# Patient Record
Sex: FEMALE | Race: WHITE | NOT HISPANIC OR LATINO
[De-identification: names, ages, dates, MRNs, and addresses within clinical notes are randomized per-mention and may not be internally consistent; named-entity substitution may affect disease eponyms.]

---

## 2019-06-07 ENCOUNTER — TRANSCRIPTION ENCOUNTER (OUTPATIENT)
Age: 74
End: 2019-06-07

## 2020-11-20 ENCOUNTER — TRANSCRIPTION ENCOUNTER (OUTPATIENT)
Age: 75
End: 2020-11-20

## 2020-11-23 ENCOUNTER — INPATIENT (INPATIENT)
Facility: HOSPITAL | Age: 75
LOS: 4 days | Discharge: HOME | End: 2020-11-28
Attending: INTERNAL MEDICINE | Admitting: INTERNAL MEDICINE
Payer: MEDICARE

## 2020-11-23 VITALS
TEMPERATURE: 98 F | HEIGHT: 66 IN | RESPIRATION RATE: 18 BRPM | WEIGHT: 156.09 LBS | HEART RATE: 161 BPM | SYSTOLIC BLOOD PRESSURE: 142 MMHG | DIASTOLIC BLOOD PRESSURE: 107 MMHG | OXYGEN SATURATION: 99 %

## 2020-11-23 DIAGNOSIS — Z98.890 OTHER SPECIFIED POSTPROCEDURAL STATES: Chronic | ICD-10-CM

## 2020-11-23 LAB
ALBUMIN SERPL ELPH-MCNC: 4.4 G/DL — SIGNIFICANT CHANGE UP (ref 3.5–5.2)
ALP SERPL-CCNC: 94 U/L — SIGNIFICANT CHANGE UP (ref 30–115)
ALT FLD-CCNC: 22 U/L — SIGNIFICANT CHANGE UP (ref 0–41)
ANION GAP SERPL CALC-SCNC: 9 MMOL/L — SIGNIFICANT CHANGE UP (ref 7–14)
AST SERPL-CCNC: 19 U/L — SIGNIFICANT CHANGE UP (ref 0–41)
BASOPHILS # BLD AUTO: 0.1 K/UL — SIGNIFICANT CHANGE UP (ref 0–0.2)
BASOPHILS NFR BLD AUTO: 0.7 % — SIGNIFICANT CHANGE UP (ref 0–1)
BILIRUB SERPL-MCNC: 0.8 MG/DL — SIGNIFICANT CHANGE UP (ref 0.2–1.2)
BUN SERPL-MCNC: 16 MG/DL — SIGNIFICANT CHANGE UP (ref 10–20)
CALCIUM SERPL-MCNC: 9.6 MG/DL — SIGNIFICANT CHANGE UP (ref 8.5–10.1)
CHLORIDE SERPL-SCNC: 106 MMOL/L — SIGNIFICANT CHANGE UP (ref 98–110)
CO2 SERPL-SCNC: 27 MMOL/L — SIGNIFICANT CHANGE UP (ref 17–32)
CREAT SERPL-MCNC: 1 MG/DL — SIGNIFICANT CHANGE UP (ref 0.7–1.5)
EOSINOPHIL # BLD AUTO: 0.17 K/UL — SIGNIFICANT CHANGE UP (ref 0–0.7)
EOSINOPHIL NFR BLD AUTO: 1.3 % — SIGNIFICANT CHANGE UP (ref 0–8)
GLUCOSE SERPL-MCNC: 123 MG/DL — HIGH (ref 70–99)
HCT VFR BLD CALC: 48 % — HIGH (ref 37–47)
HGB BLD-MCNC: 15.2 G/DL — SIGNIFICANT CHANGE UP (ref 12–16)
IMM GRANULOCYTES NFR BLD AUTO: 0.4 % — HIGH (ref 0.1–0.3)
LYMPHOCYTES # BLD AUTO: 15.8 % — LOW (ref 20.5–51.1)
LYMPHOCYTES # BLD AUTO: 2.14 K/UL — SIGNIFICANT CHANGE UP (ref 1.2–3.4)
MAGNESIUM SERPL-MCNC: 2 MG/DL — SIGNIFICANT CHANGE UP (ref 1.8–2.4)
MCHC RBC-ENTMCNC: 28.9 PG — SIGNIFICANT CHANGE UP (ref 27–31)
MCHC RBC-ENTMCNC: 31.7 G/DL — LOW (ref 32–37)
MCV RBC AUTO: 91.3 FL — SIGNIFICANT CHANGE UP (ref 81–99)
MONOCYTES # BLD AUTO: 1 K/UL — HIGH (ref 0.1–0.6)
MONOCYTES NFR BLD AUTO: 7.4 % — SIGNIFICANT CHANGE UP (ref 1.7–9.3)
NEUTROPHILS # BLD AUTO: 10.06 K/UL — HIGH (ref 1.4–6.5)
NEUTROPHILS NFR BLD AUTO: 74.4 % — SIGNIFICANT CHANGE UP (ref 42.2–75.2)
NRBC # BLD: 0 /100 WBCS — SIGNIFICANT CHANGE UP (ref 0–0)
NT-PROBNP SERPL-SCNC: 1956 PG/ML — HIGH (ref 0–300)
PLATELET # BLD AUTO: 382 K/UL — SIGNIFICANT CHANGE UP (ref 130–400)
POTASSIUM SERPL-MCNC: 4.6 MMOL/L — SIGNIFICANT CHANGE UP (ref 3.5–5)
POTASSIUM SERPL-SCNC: 4.6 MMOL/L — SIGNIFICANT CHANGE UP (ref 3.5–5)
PROT SERPL-MCNC: 6.7 G/DL — SIGNIFICANT CHANGE UP (ref 6–8)
RAPID RVP RESULT: SIGNIFICANT CHANGE UP
RBC # BLD: 5.26 M/UL — SIGNIFICANT CHANGE UP (ref 4.2–5.4)
RBC # FLD: 13.6 % — SIGNIFICANT CHANGE UP (ref 11.5–14.5)
SARS-COV-2 RNA SPEC QL NAA+PROBE: SIGNIFICANT CHANGE UP
SODIUM SERPL-SCNC: 142 MMOL/L — SIGNIFICANT CHANGE UP (ref 135–146)
TROPONIN T SERPL-MCNC: <0.01 NG/ML — SIGNIFICANT CHANGE UP
TROPONIN T SERPL-MCNC: <0.01 NG/ML — SIGNIFICANT CHANGE UP
WBC # BLD: 13.53 K/UL — HIGH (ref 4.8–10.8)
WBC # FLD AUTO: 13.53 K/UL — HIGH (ref 4.8–10.8)

## 2020-11-23 PROCEDURE — 93970 EXTREMITY STUDY: CPT | Mod: 26

## 2020-11-23 PROCEDURE — 99292 CRITICAL CARE ADDL 30 MIN: CPT

## 2020-11-23 PROCEDURE — 99291 CRITICAL CARE FIRST HOUR: CPT

## 2020-11-23 PROCEDURE — 71045 X-RAY EXAM CHEST 1 VIEW: CPT | Mod: 26

## 2020-11-23 RX ORDER — DILTIAZEM HCL 120 MG
15 CAPSULE, EXT RELEASE 24 HR ORAL ONCE
Refills: 0 | Status: COMPLETED | OUTPATIENT
Start: 2020-11-23 | End: 2020-11-23

## 2020-11-23 RX ORDER — FUROSEMIDE 40 MG
20 TABLET ORAL DAILY
Refills: 0 | Status: DISCONTINUED | OUTPATIENT
Start: 2020-11-23 | End: 2020-11-25

## 2020-11-23 RX ORDER — DILTIAZEM HCL 120 MG
30 CAPSULE, EXT RELEASE 24 HR ORAL ONCE
Refills: 0 | Status: COMPLETED | OUTPATIENT
Start: 2020-11-23 | End: 2020-11-23

## 2020-11-23 RX ORDER — DILTIAZEM HCL 120 MG
15 CAPSULE, EXT RELEASE 24 HR ORAL
Qty: 125 | Refills: 0 | Status: DISCONTINUED | OUTPATIENT
Start: 2020-11-23 | End: 2020-11-25

## 2020-11-23 RX ORDER — PANTOPRAZOLE SODIUM 20 MG/1
40 TABLET, DELAYED RELEASE ORAL
Refills: 0 | Status: DISCONTINUED | OUTPATIENT
Start: 2020-11-23 | End: 2020-11-28

## 2020-11-23 RX ORDER — INFLUENZA VIRUS VACCINE 15; 15; 15; 15 UG/.5ML; UG/.5ML; UG/.5ML; UG/.5ML
0.5 SUSPENSION INTRAMUSCULAR ONCE
Refills: 0 | Status: COMPLETED | OUTPATIENT
Start: 2020-11-23 | End: 2020-11-23

## 2020-11-23 RX ORDER — DILTIAZEM HCL 120 MG
10 CAPSULE, EXT RELEASE 24 HR ORAL
Qty: 125 | Refills: 0 | Status: DISCONTINUED | OUTPATIENT
Start: 2020-11-23 | End: 2020-11-23

## 2020-11-23 RX ORDER — ENOXAPARIN SODIUM 100 MG/ML
70 INJECTION SUBCUTANEOUS EVERY 12 HOURS
Refills: 0 | Status: DISCONTINUED | OUTPATIENT
Start: 2020-11-23 | End: 2020-11-24

## 2020-11-23 RX ORDER — LANOLIN ALCOHOL/MO/W.PET/CERES
3 CREAM (GRAM) TOPICAL ONCE
Refills: 0 | Status: COMPLETED | OUTPATIENT
Start: 2020-11-23 | End: 2020-11-23

## 2020-11-23 RX ORDER — DILTIAZEM HCL 120 MG
5 CAPSULE, EXT RELEASE 24 HR ORAL
Qty: 125 | Refills: 0 | Status: DISCONTINUED | OUTPATIENT
Start: 2020-11-23 | End: 2020-11-23

## 2020-11-23 RX ADMIN — Medication 15 MILLIGRAM(S): at 14:12

## 2020-11-23 RX ADMIN — Medication 30 MILLIGRAM(S): at 14:29

## 2020-11-23 RX ADMIN — Medication 5 MG/HR: at 16:01

## 2020-11-23 RX ADMIN — ENOXAPARIN SODIUM 70 MILLIGRAM(S): 100 INJECTION SUBCUTANEOUS at 19:02

## 2020-11-23 RX ADMIN — Medication 3 MILLIGRAM(S): at 21:57

## 2020-11-23 RX ADMIN — Medication 20 MILLIGRAM(S): at 19:02

## 2020-11-23 NOTE — ED ADULT TRIAGE NOTE - CHIEF COMPLAINT QUOTE
pt states her feet and legs are swollen since Thursday, went to PMD today and was told to be evaluated for afib, Denies Chest pain, denies shortness of breath

## 2020-11-23 NOTE — ED PROVIDER NOTE - PROGRESS NOTE DETAILS
Spoke with Dr. Vail, plan for admission to low-risk telemetry after labworks/covid results for new-onset afib. Patient HR slowly increasing to 140-150s, spoke with Dr. Denton (intensivist), patient still approved for low-risk tele. Spoke with Dr. Seaman, noted she admits to self, will change service, start on therapeutic lovenox per recommendations.

## 2020-11-23 NOTE — ED PROVIDER NOTE - NS ED ROS FT
Constitutional: (-) fever  Eyes/ENT: (-) blurry vision, (-) epistaxis  Cardiovascular: (-) chest pain, (-) syncope  Respiratory: (-) cough, (-) shortness of breath  Gastrointestinal: (-) vomiting, (-) diarrhea  Musculoskeletal: (-) neck pain, (-) back pain, (-) joint pain  Integumentary: (-) rash, (+) edema  Neurological: (-) headache, (-) altered mental status  Psychiatric: (-) hallucinations  Allergic/Immunologic: (-) pruritus

## 2020-11-23 NOTE — ED PROVIDER NOTE - PHYSICAL EXAMINATION
Vital Signs: I have reviewed the initial vital signs.  Constitutional: well-nourished, appears stated age, no acute distress.  HEENT: Airway patent, moist MM, no erythema/swelling/deformity of oral structures. EOMI, PERRLA.  CV: tachycardic, irregular, well-perfused extremities, 2+ b/l DP and radial pulses equal.  Lungs: BCTA, no increased WOB.  ABD: NTND, no guarding or rebound, no pulsatile mass, no hernias.   MSK: Neck supple, nontender, nl ROM, no stepoff. Chest nontender. Back nontender in TLS spine or to b/l bony structures or flanks. bilaterally pitting edema 1+ in LE to knees.   INTEG: Skin warm, dry, no rash.  NEURO: A&Ox3, moving all extremities, normal speech  PSYCH: Calm, cooperative, normal affect and interaction.

## 2020-11-23 NOTE — H&P ADULT - HISTORY OF PRESENT ILLNESS
75 year old female with no significant PMH presents c/o leg swelling for 4 days. Pt states she started having difficulty putting her shoes on for 4 days ago. After that the swelling travelled up to her calfs. Today pt went to see her PMD and found to be in rapid Afib. Pt was sent to ED for further evaluation. Pt admits to SOB. She denies palpitations, fever, cough, abd pain, dizziness, weakness, N/V/D/C, or urinary complaints. Never seen a cardiologist in past.   In the ED, pt's  bpm, /104, EKG showed AFib with RVP. CXR demonstrated bilateral opacities. Pt was started on Cardizem infusion.   75 year old female with no significant PMH presents c/o leg swelling for 4 days. Pt states she started having difficulty putting her shoes on for 4 days ago. After that the swelling travelled up to her calfs. Today pt went to see her PMD and found to be in rapid Afib. Pt was sent to ED for further evaluation. Pt admits to SOB. She denies palpitations, fever, cough, abd pain, dizziness, weakness, N/V/D/C, or urinary complaints. Never seen a cardiologist in past.   In the ED, pt's  bpm, /104, EKG showed AFib with RVP. CXR demonstrated bilateral opacities. Pt was started on Cardizem infusion.    Cardizem drip was titrated to 10 mg/hr declined by ICU

## 2020-11-23 NOTE — ED PROVIDER NOTE - CLINICAL SUMMARY MEDICAL DECISION MAKING FREE TEXT BOX
patient presents for evaluation of sob and leg swelling found to have b/l edema with new onset afib at a rate of 150'-160's who denies any cp, she denies any fevers or chills started on cardizem iv I will admit for further management at this time.  In addition we discussed the c ase with ICU who advises floor admission

## 2020-11-23 NOTE — ED PROVIDER NOTE - OBJECTIVE STATEMENT
75F hx varicose veins presents with leg swelling, afib. patient notes swelling started in both legs 4 days ago, went initially to urgent care and was cleared. Patient then started to have difficulty putting on her shoes, denies any palpitations/shortness of breath, but felt more tired, went to PMD and was found to be in rapid afib to 170s, sent to ED. patient denies any fever/cough/vomiting/diarhhea/abd pain.

## 2020-11-23 NOTE — ED PROVIDER NOTE - ATTENDING CONTRIBUTION TO CARE
I was present for and supervised the key and critical aspects of the procedures performed during the care of the patient. Patient presetns for evaluation of b/l lower extremity swelling since thursday she was sen and evaluated outpatient and found to be in afib with rvr, which is new onset.  she denies any headache, chest pain or sob she notes palpitations.    On physical exam the patient is nc/at perrla eomi oropharynx clear cta b/l, patient found to be irregularly irregular, abd-soft nt nd bs+ ext from no focal deficits   A/P- we obtained labs, in addition started on cardizem initially ivp oral and iv drip In addition given iv fluids I will admit for further management at this time

## 2020-11-23 NOTE — H&P ADULT - NSHPPHYSICALEXAM_GEN_ALL_CORE
VITALS:   T(C): 36.4 (11-23-20 @ 13:50), Max: 36.4 (11-23-20 @ 13:50)  HR: 145 (11-23-20 @ 18:30) (119 - 161)  BP: 141/92 (11-23-20 @ 18:30) (115/77 - 164/104)  RR: 20 (11-23-20 @ 18:30) (18 - 22)  SpO2: 95% (11-23-20 @ 18:30) (90% - 99%)    GENERAL: NAD, lying in bed comfortably  HEAD:  Atraumatic, Normocephalic  EYES: EOMI, PERRLA, conjunctiva and sclera clear  ENT: Moist mucous membranes  NECK: Supple  CHEST/LUNG: CTA b/l, no wheezing; unlabored respirations  HEART: Irregularly irregular, no murmurs, rubs, or gallops  ABDOMEN: +BS, soft, nontender, nondistended  EXTREMITIES:  No pitting edema b/l, 2+ Peripheral Pulses, brisk capillary refill. No calt tenderness  NERVOUS SYSTEM:  Alert & Oriented X3, speech clear  MSK: FROM all 4 extremities, full and equal strength  SKIN: No rashes or lesions

## 2020-11-23 NOTE — H&P ADULT - NSHPLABSRESULTS_GEN_ALL_CORE
15.2   13.53 )-----------( 382      ( 23 Nov 2020 14:06 )             48.0       11-23    142  |  106  |  16  ----------------------------<  123<H>  4.6   |  27  |  1.0    Ca    9.6      23 Nov 2020 14:06  Mg     2.0     11-23    TPro  6.7  /  Alb  4.4  /  TBili  0.8  /  DBili  x   /  AST  19  /  ALT  22  /  AlkPhos  94  11-23          Lactate Trend      CARDIAC MARKERS ( 23 Nov 2020 14:06 )  x     / <0.01 ng/mL / x     / x     / x            CAPILLARY BLOOD GLUCOSE      RADIOLOGY:  Xray Chest 1 View-PORTABLE IMMEDIATE (11.23.20 @ 14:41)     IMPRESSION:    Bilateral opacities/effusions.      TARSHA BOYD MD; Attending Radiologist  This document has been electronically signed. Nov 23 2020  2:50PM

## 2020-11-23 NOTE — H&P ADULT - ASSESSMENT
75 year old female with no significant PMH send to ED from PMD office after being found with Afib with RVP. Pt also c/o leg swelling for 4 days.    # AFib with RVP  - admit to soft tele  - LEEANN  - cardiology consult  - continue Cardizem infusion  - will start Lovenox 70 BID for AC  - tele monitoring  - EKG in AM  - 2 more sets of cardiac enzymes; 1st set negative  - venous duplex to r/o DVT  - AM labs  - check TSH  - monitor vitals    # Fluid overload  - Lasix 20 mg IV qd  - strict I/Os  - daily weight checks    Diet:DASH/TLC   75 year old female with no significant PMH send to ED from PMD office after being found with Afib with RVP. Pt also c/o leg swelling for 4 days.    # AFib with RVP  - admit to soft tele  - TTE  - cardiology consult  - continue Cardizem infusion  - will start Lovenox 70 BID for AC given her chads-vasc score  - tele monitoring  - EKG in AM  - 2 more sets of cardiac enzymes; 1st set negative  - venous duplex to r/o DVT  - AM labs  - check TSH  - monitor vitals  -Signout given to nocturnist to evaluate if further titration of drip VS po metoprolol is needed for better HR control    # Fluid overload  - Lasix 20 mg IV qd  - strict I/Os  - daily weight checks  -TTE    Diet:DASH/TLC    #Progress Note Handoff  Pending (specify):  as above  Family discussion:  plan of care was discussed with patient   in details.  all questions were answered.  seems to understand, and in agreement  Disposition:  unknown     75 year old female with no significant PMH send to ED from PMD office after being found with Afib with RVP. Pt also c/o leg swelling for 4 days.    # AFib with RVP  - admit to soft tele  - TTE  - cardiology consult  - continue Cardizem infusion  - will start Lovenox 70 BID for AC given her chads-vasc score  - tele monitoring switch to zarelto 20mg daily tonight per cardiology  - EKG in AM  - 2 more sets of cardiac enzymes; 1st set negative  - venous duplex to r/o DVT  - AM labs  - check TSH  - monitor vitals  -Signout given to nocturnist to evaluate if further titration of drip VS po metoprolol is needed for better HR control    # Fluid overload  - Lasix 20 mg IV qd  - strict I/Os  - daily weight checks  -TTE    Diet:DASH/TLC    #Progress Note Handoff  Pending (specify):  as above  Family discussion:  plan of care was discussed with patient   in details.  all questions were answered.  seems to understand, and in agreement  Disposition:  unknown

## 2020-11-24 LAB
ANION GAP SERPL CALC-SCNC: 14 MMOL/L — SIGNIFICANT CHANGE UP (ref 7–14)
BUN SERPL-MCNC: 13 MG/DL — SIGNIFICANT CHANGE UP (ref 10–20)
CALCIUM SERPL-MCNC: 9.6 MG/DL — SIGNIFICANT CHANGE UP (ref 8.5–10.1)
CHLORIDE SERPL-SCNC: 104 MMOL/L — SIGNIFICANT CHANGE UP (ref 98–110)
CO2 SERPL-SCNC: 27 MMOL/L — SIGNIFICANT CHANGE UP (ref 17–32)
CREAT SERPL-MCNC: 0.9 MG/DL — SIGNIFICANT CHANGE UP (ref 0.7–1.5)
GLUCOSE SERPL-MCNC: 79 MG/DL — SIGNIFICANT CHANGE UP (ref 70–99)
HCT VFR BLD CALC: 45.1 % — SIGNIFICANT CHANGE UP (ref 37–47)
HGB BLD-MCNC: 14.1 G/DL — SIGNIFICANT CHANGE UP (ref 12–16)
MCHC RBC-ENTMCNC: 28.6 PG — SIGNIFICANT CHANGE UP (ref 27–31)
MCHC RBC-ENTMCNC: 31.3 G/DL — LOW (ref 32–37)
MCV RBC AUTO: 91.5 FL — SIGNIFICANT CHANGE UP (ref 81–99)
NRBC # BLD: 0 /100 WBCS — SIGNIFICANT CHANGE UP (ref 0–0)
PLATELET # BLD AUTO: 337 K/UL — SIGNIFICANT CHANGE UP (ref 130–400)
POTASSIUM SERPL-MCNC: 4.1 MMOL/L — SIGNIFICANT CHANGE UP (ref 3.5–5)
POTASSIUM SERPL-SCNC: 4.1 MMOL/L — SIGNIFICANT CHANGE UP (ref 3.5–5)
RBC # BLD: 4.93 M/UL — SIGNIFICANT CHANGE UP (ref 4.2–5.4)
RBC # FLD: 13.7 % — SIGNIFICANT CHANGE UP (ref 11.5–14.5)
SODIUM SERPL-SCNC: 145 MMOL/L — SIGNIFICANT CHANGE UP (ref 135–146)
TROPONIN T SERPL-MCNC: <0.01 NG/ML — SIGNIFICANT CHANGE UP
TSH SERPL-MCNC: 1.58 UIU/ML — SIGNIFICANT CHANGE UP (ref 0.27–4.2)
WBC # BLD: 10.64 K/UL — SIGNIFICANT CHANGE UP (ref 4.8–10.8)
WBC # FLD AUTO: 10.64 K/UL — SIGNIFICANT CHANGE UP (ref 4.8–10.8)

## 2020-11-24 RX ORDER — RIVAROXABAN 15 MG-20MG
20 KIT ORAL
Refills: 0 | Status: DISCONTINUED | OUTPATIENT
Start: 2020-11-24 | End: 2020-11-28

## 2020-11-24 RX ORDER — METOPROLOL TARTRATE 50 MG
25 TABLET ORAL EVERY 6 HOURS
Refills: 0 | Status: DISCONTINUED | OUTPATIENT
Start: 2020-11-24 | End: 2020-11-28

## 2020-11-24 RX ORDER — METOPROLOL TARTRATE 50 MG
25 TABLET ORAL
Refills: 0 | Status: DISCONTINUED | OUTPATIENT
Start: 2020-11-24 | End: 2020-11-24

## 2020-11-24 RX ORDER — LANOLIN ALCOHOL/MO/W.PET/CERES
5 CREAM (GRAM) TOPICAL AT BEDTIME
Refills: 0 | Status: DISCONTINUED | OUTPATIENT
Start: 2020-11-24 | End: 2020-11-28

## 2020-11-24 RX ADMIN — Medication 15 MG/HR: at 10:10

## 2020-11-24 RX ADMIN — PANTOPRAZOLE SODIUM 40 MILLIGRAM(S): 20 TABLET, DELAYED RELEASE ORAL at 05:12

## 2020-11-24 RX ADMIN — Medication 25 MILLIGRAM(S): at 17:26

## 2020-11-24 RX ADMIN — Medication 25 MILLIGRAM(S): at 11:51

## 2020-11-24 RX ADMIN — Medication 25 MILLIGRAM(S): at 22:58

## 2020-11-24 RX ADMIN — ENOXAPARIN SODIUM 70 MILLIGRAM(S): 100 INJECTION SUBCUTANEOUS at 05:12

## 2020-11-24 RX ADMIN — Medication 5 MILLIGRAM(S): at 22:58

## 2020-11-24 RX ADMIN — RIVAROXABAN 20 MILLIGRAM(S): KIT at 17:26

## 2020-11-24 RX ADMIN — Medication 15 MG/HR: at 01:28

## 2020-11-24 NOTE — CONSULT NOTE ADULT - SUBJECTIVE AND OBJECTIVE BOX
CARDIOLOGY CONSULT NOTE     CHIEF COMPLAINT/REASON FOR CONSULT:    HPI:  75 year old female with no significant PMH presents c/o leg swelling for 4 days. Pt states she started having difficulty putting her shoes on for 4 days ago. After that the swelling travelled up to her calfs. Today pt went to see her PMD and found to be in rapid Afib. Pt was sent to ED for further evaluation. Pt admits to SOB. She denies palpitations, fever, cough, abd pain, dizziness, weakness, N/V/D/C, or urinary complaints. Never seen a cardiologist in past.   In the ED, pt's  bpm, /104, EKG showed AFib with RVP. CXR demonstrated bilateral opacities. Pt was started on Cardizem infusion.    Cardizem drip was titrated to 10 mg/hr declined by ICU   (23 Nov 2020 18:27)      PAST MEDICAL & SURGICAL HISTORY:  No pertinent past medical history    H/O varicose vein stripping        Cardiac Risks:   [ ]HTN, [ ] DM, [x ] Smoking, [ ] FH,  [ ] Lipids        MEDICATIONS:  MEDICATIONS  (STANDING):  diltiazem Infusion 15 mG/Hr (15 mL/Hr) IV Continuous <Continuous>  enoxaparin Injectable 70 milliGRAM(s) SubCutaneous every 12 hours  furosemide   Injectable 20 milliGRAM(s) IV Push daily  influenza   Vaccine 0.5 milliLiter(s) IntraMuscular once  pantoprazole    Tablet 40 milliGRAM(s) Oral before breakfast      FAMILY HISTORY:  FH: prostate cancer (Father)  father        SOCIAL HISTORY:      [ ] Marital status   Allergies    No Known Allergies    Intolerances    	    REVIEW OF SYSTEMS:  CONSTITUTIONAL: No fever, weight loss, or fatigue  EYES: No eye pain, visual disturbances, or discharge  ENMT:  No difficulty hearing, tinnitus, vertigo; No sinus or throat pain  NECK: No pain or stiffness  RESPIRATORY: No cough, wheezing, chills or hemoptysis; No Shortness of Breath  CARDIOVASCULAR: See above  GASTROINTESTINAL: No abdominal or epigastric pain. No nausea, vomiting, or hematemesis; No diarrhea or constipation. No melena or hematochezia.  GENITOURINARY: No dysuria, frequency, hematuria, or incontinence  NEUROLOGICAL: No headaches, memory loss, loss of strength, numbness, or tremors  SKIN: No itching, burning, rashes, or lesions     PHYSICAL EXAM:  T(C): 35.7 (11-24-20 @ 05:44), Max: 36.4 (11-23-20 @ 13:50)  HR: 96 (11-24-20 @ 05:44) (96 - 161)  BP: 114/57 (11-24-20 @ 05:44) (114/57 - 164/104)  RR: 18 (11-24-20 @ 06:32) (18 - 22)  SpO2: 94% (11-24-20 @ 06:32) (90% - 99%)  Wt(kg): --  I&O's Summary    23 Nov 2020 07:01  -  24 Nov 2020 07:00  --------------------------------------------------------  IN: 0 mL / OUT: 1100 mL / NET: -1100 mL        Appearance: Normal	  Psychiatry: A & O x 3, Mood & affect appropriate  HEENT:   Normal oral mucosa, PERRL, EOMI	  Lymphatic: No lymphadenopathy  Cardiovascular: Normal S1 S2,RRR, No JVD, No murmurs  Respiratory: Lungs clear to auscultation	  Gastrointestinal:  Soft, Non-tender, + BS	  Skin: No rashes, No ecchymoses, No cyanosis	  Neurologic: Non-focal  Extremities: Normal range of motion, No clubbing, cyanosis or edema  Vascular: Peripheral pulses palpable 2+ bilaterally      ECG:  	afib rad ns st    	  LABS:	 	    CARDIAC MARKERS:          Serum Pro-Brain Natriuretic Peptide: 1956 pg/mL (11-23 @ 14:06)                            14.1   10.64 )-----------( 337      ( 24 Nov 2020 05:45 )             45.1     11-24    145  |  104  |  13  ----------------------------<  79  4.1   |  27  |  0.9    Ca    9.6      24 Nov 2020 05:45  Mg     2.0     11-23    TPro  6.7  /  Alb  4.4  /  TBili  0.8  /  DBili  x   /  AST  19  /  ALT  22  /  AlkPhos  94  11-23      proBNP: Serum Pro-Brain Natriuretic Peptide: 1956 pg/mL (11-23 @ 14:06)

## 2020-11-24 NOTE — PROGRESS NOTE ADULT - SUBJECTIVE AND OBJECTIVE BOX
75 year old female with no significant PMH presents c/o leg swelling for 4 days. Pt states she started having difficulty putting her shoes on for 4 days ago. After that the swelling travelled up to her calfs. Today pt went to see her PMD and found to be in rapid Afib. Pt was sent to ED for further evaluation. Pt admits to SOB. She denies palpitations, fever, cough, abd pain, dizziness, weakness, N/V/D/C, or urinary complaints. Never seen a cardiologist in past.   In the ED, pt's  bpm, /104, EKG showed AFib with RVP. CXR demonstrated bilateral opacities. Pt was started on Cardizem infusion.    Cardizem drip was titrated to 10 mg/hr declined by ICU   (23 Nov 2020 18:27)      PAST MEDICAL & SURGICAL HISTORY:  No pertinent past medical history    H/O varicose vein stripping        Cardiac Risks:   [ ]HTN, [ ] DM, [x ] Smoking, [ ] FH,  [ ] Lipids        MEDICATIONS:  MEDICATIONS  (STANDING):  diltiazem Infusion 15 mG/Hr (15 mL/Hr) IV Continuous <Continuous>  enoxaparin Injectable 70 milliGRAM(s) SubCutaneous every 12 hours  furosemide   Injectable 20 milliGRAM(s) IV Push daily  influenza   Vaccine 0.5 milliLiter(s) IntraMuscular once  pantoprazole    Tablet 40 milliGRAM(s) Oral before breakfast      FAMILY HISTORY:  FH: prostate cancer (Father)  father        SOCIAL HISTORY:      [ ] Marital status   Allergies    No Known Allergies    Intolerances    	    REVIEW OF SYSTEMS:  CONSTITUTIONAL: No fever, weight loss, or fatigue  EYES: No eye pain, visual disturbances, or discharge  ENMT:  No difficulty hearing, tinnitus, vertigo; No sinus or throat pain  NECK: No pain or stiffness  RESPIRATORY: No cough, wheezing, chills or hemoptysis; No Shortness of Breath  CARDIOVASCULAR: See above  GASTROINTESTINAL: No abdominal or epigastric pain. No nausea, vomiting, or hematemesis; No diarrhea or constipation. No melena or hematochezia.  GENITOURINARY: No dysuria, frequency, hematuria, or incontinence  NEUROLOGICAL: No headaches, memory loss, loss of strength, numbness, or tremors  SKIN: No itching, burning, rashes, or lesions     PHYSICAL EXAM:  T(C): 35.7 (11-24-20 @ 05:44), Max: 36.4 (11-23-20 @ 13:50)  HR: 96 (11-24-20 @ 05:44) (96 - 161)  BP: 114/57 (11-24-20 @ 05:44) (114/57 - 164/104)  RR: 18 (11-24-20 @ 06:32) (18 - 22)  SpO2: 94% (11-24-20 @ 06:32) (90% - 99%)  Wt(kg): --  I&O's Summary    23 Nov 2020 07:01  -  24 Nov 2020 07:00  --------------------------------------------------------  IN: 0 mL / OUT: 1100 mL / NET: -1100 mL        Appearance: Normal	  Psychiatry: A & O x 3, Mood & affect appropriate  HEENT:   Normal oral mucosa, PERRL, EOMI	  Lymphatic: No lymphadenopathy  Cardiovascular: Normal S1 S2,RRR, No JVD, No murmurs  Respiratory: Lungs clear to auscultation	  Gastrointestinal:  Soft, Non-tender, + BS	  Skin: No rashes, No ecchymoses, No cyanosis	  Neurologic: Non-focal  Extremities: Normal range of motion, No clubbing, cyanosis or edema  Vascular: Peripheral pulses palpable 2+ bilaterally      ECG:  	afib rad ns st    	  LABS:	 	    CARDIAC MARKERS:          Serum Pro-Brain Natriuretic Peptide: 1956 pg/mL (11-23 @ 14:06)                            14.1   10.64 )-----------( 337      ( 24 Nov 2020 05:45 )             45.1     11-24    145  |  104  |  13  ----------------------------<  79  4.1   |  27  |  0.9    Ca    9.6      24 Nov 2020 05:45  Mg     2.0     11-23    TPro  6.7  /  Alb  4.4  /  TBili  0.8  /  DBili  x   /  AST  19  /  ALT  22  /  AlkPhos  94  11-23      proBNP: Serum Pro-Brain Natriuretic Peptide: 1956 pg/mL (11-23 @ 14:06)

## 2020-11-24 NOTE — CHART NOTE - NSCHARTNOTEFT_GEN_A_CORE
Pt admitted with LE edema, A.fib, and Volume overload. Pt currently on therapeutic Lovenox and Cardizem gtt.  Pt seen and evaluated at bedside. She reports feeling well and is without complaints. LE edema resolved.  Rate currently controlled.  Cardiology Consult appreciated. Will start Lopressor 25mg q6h and discontinue Cardizem gtt 1 hr following 1st dose, if rate controlled-per d/w cardiology. Pt will also have TFT's, willl d/w Dr Seaman regarding starting Xarelto based upon cardiac clearance. Pt is to have an outpatient stress echo and possible cardioversion in 4-6 weeks.  Possible discharge in am.

## 2020-11-24 NOTE — CONSULT NOTE ADULT - ASSESSMENT
Patient had leg swelling. No SOB palpitations or chest pain. Found afib volume overload. HR controlled on iv cardizem . Ankle swelling resolved. Begin bta. Wean d/c iv cardizem. Check echo . Check thyroid . Xarelto. Outpatient stress echo. Consider in 4-6 weeks outpatient cardioversion. Patient had leg swelling. No SOB palpitations or chest pain. Found afib volume overload. HR controlled on iv cardizem . Ankle swelling resolved. Begin bta. Wean d/c iv cardizem. Check echo . Check thyroid . Xarelto. Dose depending on creat. clearance.  Outpatient stress echo. Consider in 4-6 weeks outpatient cardioversion.

## 2020-11-25 LAB
ANION GAP SERPL CALC-SCNC: 11 MMOL/L — SIGNIFICANT CHANGE UP (ref 7–14)
BUN SERPL-MCNC: 19 MG/DL — SIGNIFICANT CHANGE UP (ref 10–20)
CALCIUM SERPL-MCNC: 9.1 MG/DL — SIGNIFICANT CHANGE UP (ref 8.5–10.1)
CHLORIDE SERPL-SCNC: 101 MMOL/L — SIGNIFICANT CHANGE UP (ref 98–110)
CO2 SERPL-SCNC: 28 MMOL/L — SIGNIFICANT CHANGE UP (ref 17–32)
CREAT SERPL-MCNC: 1 MG/DL — SIGNIFICANT CHANGE UP (ref 0.7–1.5)
GLUCOSE SERPL-MCNC: 86 MG/DL — SIGNIFICANT CHANGE UP (ref 70–99)
POTASSIUM SERPL-MCNC: 3.8 MMOL/L — SIGNIFICANT CHANGE UP (ref 3.5–5)
POTASSIUM SERPL-SCNC: 3.8 MMOL/L — SIGNIFICANT CHANGE UP (ref 3.5–5)
SARS-COV-2 IGG SERPL QL IA: NEGATIVE — SIGNIFICANT CHANGE UP
SARS-COV-2 IGM SERPL IA-ACNC: <0.1 INDEX — SIGNIFICANT CHANGE UP
SODIUM SERPL-SCNC: 140 MMOL/L — SIGNIFICANT CHANGE UP (ref 135–146)

## 2020-11-25 RX ORDER — FUROSEMIDE 40 MG
40 TABLET ORAL DAILY
Refills: 0 | Status: DISCONTINUED | OUTPATIENT
Start: 2020-11-25 | End: 2020-11-28

## 2020-11-25 RX ORDER — DILTIAZEM HCL 120 MG
10 CAPSULE, EXT RELEASE 24 HR ORAL
Qty: 125 | Refills: 0 | Status: DISCONTINUED | OUTPATIENT
Start: 2020-11-25 | End: 2020-11-26

## 2020-11-25 RX ADMIN — Medication 25 MILLIGRAM(S): at 05:22

## 2020-11-25 RX ADMIN — Medication 40 MILLIGRAM(S): at 10:37

## 2020-11-25 RX ADMIN — Medication 10 MG/HR: at 21:14

## 2020-11-25 RX ADMIN — Medication 25 MILLIGRAM(S): at 11:43

## 2020-11-25 RX ADMIN — Medication 20 MILLIGRAM(S): at 05:22

## 2020-11-25 RX ADMIN — Medication 10 MG/HR: at 08:52

## 2020-11-25 RX ADMIN — PANTOPRAZOLE SODIUM 40 MILLIGRAM(S): 20 TABLET, DELAYED RELEASE ORAL at 05:22

## 2020-11-25 RX ADMIN — Medication 25 MILLIGRAM(S): at 17:33

## 2020-11-25 RX ADMIN — Medication 5 MILLIGRAM(S): at 21:14

## 2020-11-25 RX ADMIN — Medication 25 MILLIGRAM(S): at 23:31

## 2020-11-25 RX ADMIN — RIVAROXABAN 20 MILLIGRAM(S): KIT at 17:33

## 2020-11-25 NOTE — CHART NOTE - NSCHARTNOTEFT_GEN_A_CORE
Pt replaced on Cardizem gtt at 10mg/hr and currently on Lopressor 25 mg q6h.  HR improved with addtion of Cardizem gtt. SBP has fluctuated from 104-130, HR running mid 80's -105bpm., 1 hr following evening dose of Lopressor. Will continue Cardizem gtt overnight and reassess maintenance oral meds in am. Consider starting Lopressor 75 mg q12h in am and discontinuing Cardizem gtt at least 1 hr following first dose, if HR controlled.

## 2020-11-25 NOTE — PROGRESS NOTE ADULT - SUBJECTIVE AND OBJECTIVE BOX
75 year old female with no significant PMH presents c/o leg swelling for 4 days. Pt states she started having difficulty putting her shoes on for 4 days ago. After that the swelling travelled up to her calfs. Today pt went to see her PMD and found to be in rapid Afib. Pt was sent to ED for further evaluation. Pt admits to SOB. She denies palpitations, fever, cough, abd pain, dizziness, weakness, N/V/D/C, or urinary complaints. Never seen a cardiologist in past.   In the ED, pt's  bpm, /104, EKG showed AFib with RVP. CXR demonstrated bilateral opacities. Pt was started on Cardizem infusion.    Cardizem drip was titrated to 10 mg/hr declined by ICU   (23 Nov 2020 18:27)      PAST MEDICAL & SURGICAL HISTORY:  No pertinent past medical history    H/O varicose vein stripping        Cardiac Risks:   [ ]HTN, [ ] DM, [x ] Smoking, [ ] FH,  [ ] Lipids        MEDICATIONS:  MEDICATIONS  (STANDING):  diltiazem Infusion 15 mG/Hr (15 mL/Hr) IV Continuous <Continuous>  enoxaparin Injectable 70 milliGRAM(s) SubCutaneous every 12 hours  furosemide   Injectable 20 milliGRAM(s) IV Push daily  influenza   Vaccine 0.5 milliLiter(s) IntraMuscular once  pantoprazole    Tablet 40 milliGRAM(s) Oral before breakfast      FAMILY HISTORY:  FH: prostate cancer (Father)  father        SOCIAL HISTORY:      [ ] Marital status   Allergies    No Known Allergies    Intolerances    	    REVIEW OF SYSTEMS:  CONSTITUTIONAL pt has sob going to bathroom new today no chest pain  EYES: No eye pain, visual disturbances, or discharge  NECK: No pain or stiffness  RESPIRATORY:  Shortness of Breath  CARDIOVASCULAR: no chest pain does have gonzalez  GASTROINTESTINAL: No abdominal or epigastric pain. No nausea, vomiting, or hematemesis; No diarrhea or constipation. No melena or hematochezia.  GENITOURINARY: No dysuria, frequency, hematuria, or incontinence  NEUROLOGICAL: No headaches, memory loss, loss of strength, numbness, or tremors  SKIN: No itching, burning, rashes, or lesions     PHYSICAL EXAM:  ICU Vital Signs Last 24 Hrs  T(C): 35.9 (25 Nov 2020 05:40), Max: 36 (24 Nov 2020 21:00)  T(F): 96.7 (25 Nov 2020 05:40), Max: 96.8 (24 Nov 2020 21:00)  HR: 126 (25 Nov 2020 08:36) (107 - 126)  BP: 132/80 (25 Nov 2020 08:36) (97/59 - 132/80)  BP(mean): --  ABP: --  ABP(mean): --  RR: 16 (25 Nov 2020 05:40) (16 - 18)    Appearance: Normal	  Psychiatry: A & O x 3, Mood & affect appropriate  HEENT:   Normal oral mucosa, PERRL, EOMI	  Lymphatic: No lymphadenopathy  Cardiovascular: Normal S1 S2,irregular, No JVD, No murmurs bibasilar rales left greater  Respiratory: Lungs basilar rales	  Gastrointestinal:  Soft, Non-tender, + BS	  Skin: No rashes, No ecchymoses, No cyanosis	  Neurologic: Non-focal  Extremities: Normal range of motion, No clubbing, cyanosis  mild edema  Vascular: Peripheral pulses palpable 2+ bilaterally      ECG:  	afib rad ns st    	  LABS:	                      14.1   10.64 )-----------( 337      ( 24 Nov 2020 05:45 )             45.1   11-24    145  |  104  |  13  ----------------------------<  79  4.1   |  27  |  0.9    Ca    9.6      24 Nov 2020 05:45  Mg     2.0     11-23    TPro  6.7  /  Alb  4.4  /  TBili  0.8  /  DBili  x   /  AST  19  /  ALT  22  /  AlkPhos  94  11-23   	        proBNP: Serum Pro-Brain Natriuretic Peptide: 1956 pg/mL (11-23 @ 14:06)

## 2020-11-26 LAB
ANION GAP SERPL CALC-SCNC: 14 MMOL/L — SIGNIFICANT CHANGE UP (ref 7–14)
BUN SERPL-MCNC: 20 MG/DL — SIGNIFICANT CHANGE UP (ref 10–20)
CALCIUM SERPL-MCNC: 10 MG/DL — SIGNIFICANT CHANGE UP (ref 8.5–10.1)
CHLORIDE SERPL-SCNC: 98 MMOL/L — SIGNIFICANT CHANGE UP (ref 98–110)
CO2 SERPL-SCNC: 32 MMOL/L — SIGNIFICANT CHANGE UP (ref 17–32)
CREAT SERPL-MCNC: 1.2 MG/DL — SIGNIFICANT CHANGE UP (ref 0.7–1.5)
GLUCOSE SERPL-MCNC: 91 MG/DL — SIGNIFICANT CHANGE UP (ref 70–99)
HCT VFR BLD CALC: 44.7 % — SIGNIFICANT CHANGE UP (ref 37–47)
HGB BLD-MCNC: 14.5 G/DL — SIGNIFICANT CHANGE UP (ref 12–16)
MCHC RBC-ENTMCNC: 29 PG — SIGNIFICANT CHANGE UP (ref 27–31)
MCHC RBC-ENTMCNC: 32.4 G/DL — SIGNIFICANT CHANGE UP (ref 32–37)
MCV RBC AUTO: 89.4 FL — SIGNIFICANT CHANGE UP (ref 81–99)
NRBC # BLD: 0 /100 WBCS — SIGNIFICANT CHANGE UP (ref 0–0)
PLATELET # BLD AUTO: 326 K/UL — SIGNIFICANT CHANGE UP (ref 130–400)
POTASSIUM SERPL-MCNC: 3.5 MMOL/L — SIGNIFICANT CHANGE UP (ref 3.5–5)
POTASSIUM SERPL-SCNC: 3.5 MMOL/L — SIGNIFICANT CHANGE UP (ref 3.5–5)
RBC # BLD: 5 M/UL — SIGNIFICANT CHANGE UP (ref 4.2–5.4)
RBC # FLD: 13.4 % — SIGNIFICANT CHANGE UP (ref 11.5–14.5)
SODIUM SERPL-SCNC: 144 MMOL/L — SIGNIFICANT CHANGE UP (ref 135–146)
T4 FREE SERPL-MCNC: 1.9 NG/DL — HIGH (ref 0.9–1.8)
TSH SERPL-MCNC: 1.96 UIU/ML — SIGNIFICANT CHANGE UP (ref 0.27–4.2)
WBC # BLD: 10.83 K/UL — HIGH (ref 4.8–10.8)
WBC # FLD AUTO: 10.83 K/UL — HIGH (ref 4.8–10.8)

## 2020-11-26 RX ORDER — DILTIAZEM HCL 120 MG
120 CAPSULE, EXT RELEASE 24 HR ORAL DAILY
Refills: 0 | Status: DISCONTINUED | OUTPATIENT
Start: 2020-11-26 | End: 2020-11-27

## 2020-11-26 RX ADMIN — Medication 40 MILLIGRAM(S): at 09:28

## 2020-11-26 RX ADMIN — Medication 25 MILLIGRAM(S): at 09:28

## 2020-11-26 RX ADMIN — RIVAROXABAN 20 MILLIGRAM(S): KIT at 17:24

## 2020-11-26 RX ADMIN — Medication 25 MILLIGRAM(S): at 21:22

## 2020-11-26 RX ADMIN — PANTOPRAZOLE SODIUM 40 MILLIGRAM(S): 20 TABLET, DELAYED RELEASE ORAL at 09:28

## 2020-11-26 RX ADMIN — Medication 25 MILLIGRAM(S): at 17:24

## 2020-11-26 RX ADMIN — Medication 25 MILLIGRAM(S): at 14:13

## 2020-11-26 RX ADMIN — Medication 120 MILLIGRAM(S): at 11:24

## 2020-11-26 RX ADMIN — Medication 5 MILLIGRAM(S): at 21:22

## 2020-11-26 NOTE — PROGRESS NOTE ADULT - SUBJECTIVE AND OBJECTIVE BOX
75 year old female with no significant PMH presents c/o leg swelling for 4 days. Pt states she started having difficulty putting her shoes on for 4 days ago. After that the swelling travelled up to her calfs. Today pt went to see her PMD and found to be in rapid Afib. Pt was sent to ED for further evaluation. Pt admits to SOB. She denies palpitations, fever, cough, abd pain, dizziness, weakness, N/V/D/C, or urinary complaints. Never seen a cardiologist in past.   In the ED, pt's  bpm, /104, EKG showed AFib with RVP. CXR demonstrated bilateral opacities. Pt was started on Cardizem infusion.    Cardizem drip was titrated to 10 mg/hr declined by ICU   (23 Nov 2020 18:27)      PAST MEDICAL & SURGICAL HISTORY:  No pertinent past medical history    H/O varicose vein stripping        Cardiac Risks:   [ ]HTN, [ ] DM, [x ] Smoking, [ ] FH,  [ ] Lipids        MEDICATIONS:  MEDICATIONS  (STANDING):  diltiazem Infusion 15 mG/Hr (15 mL/Hr) IV Continuous <Continuous>  enoxaparin Injectable 70 milliGRAM(s) SubCutaneous every 12 hours  furosemide   Injectable 20 milliGRAM(s) IV Push daily  influenza   Vaccine 0.5 milliLiter(s) IntraMuscular once  pantoprazole    Tablet 40 milliGRAM(s) Oral before breakfast      FAMILY HISTORY:  FH: prostate cancer (Father)  father        SOCIAL HISTORY:      [ ] Marital status   Allergies    No Known Allergies    Intolerances    	    REVIEW OF SYSTEMS:  CONSTITUTIONAL pt has sob going to bathroom new today no chest pain  EYES: No eye pain, visual disturbances, or discharge  NECK: No pain or stiffness  RESPIRATORY:  Shortness of Breath  CARDIOVASCULAR: no chest pain does have gonzalez  GASTROINTESTINAL: No abdominal or epigastric pain. No nausea, vomiting, or hematemesis; No diarrhea or constipation. No melena or hematochezia.  GENITOURINARY: No dysuria, frequency, hematuria, or incontinence  NEUROLOGICAL: No headaches, memory loss, loss of strength, numbness, or tremors  SKIN: No itching, burning, rashes, or lesions     PHYSICAL EXAM  Vital Signs Last 24 Hrs  T(C): 35.8 (26 Nov 2020 05:15), Max: 35.8 (25 Nov 2020 13:51)  T(F): 96.4 (26 Nov 2020 05:15), Max: 96.5 (25 Nov 2020 13:51)  HR: 94 (26 Nov 2020 05:15) (78 - 104)  BP: 116/75 (26 Nov 2020 05:15) (104/61 - 122/83)  BP(mean): --  RR: 19 (26 Nov 2020 05:15) (16 - 19)  SpO2: --  Appearance: Normal	  Psychiatry: A & O x 3, Mood & affect appropriate  HEENT:   Normal oral mucosa, PERRL, EOMI	  Lymphatic: No lymphadenopathy  Cardiovascular: Normal S1 S2,irregular, No JVD, No murmurs   Respiratory: Lungs basilar rales	improving  Gastrointestinal:  Soft, Non-tender, + BS	  Skin: No rashes, No ecchymoses, No cyanosis	  Neurologic: Non-focal  Extremities: Normal range of motion, No clubbing, cyanosis  mild edema  Vascular: Peripheral pulses palpable 2+ bilaterally      ECG:  	afib rad ns st    	  LABS:	                      14.1   10.64 )-----------( 337      ( 24 Nov 2020 05:45 )             45.1   11-24    145  |  104  |  13  ----------------------------<  79  4.1   |  27  |  0.9    Ca    9.6      24 Nov 2020 05:45  Mg     2.0     11-23    TPro  6.7  /  Alb  4.4  /  TBili  0.8  /  DBili  x   /  AST  19  /  ALT  22  /  AlkPhos  94  11-23   	        proBNP: Serum Pro-Brain Natriuretic Peptide: 1956 pg/mL (11-23 @ 14:06)

## 2020-11-27 RX ORDER — DILTIAZEM HCL 120 MG
180 CAPSULE, EXT RELEASE 24 HR ORAL DAILY
Refills: 0 | Status: DISCONTINUED | OUTPATIENT
Start: 2020-11-28 | End: 2020-11-28

## 2020-11-27 RX ADMIN — PANTOPRAZOLE SODIUM 40 MILLIGRAM(S): 20 TABLET, DELAYED RELEASE ORAL at 05:48

## 2020-11-27 RX ADMIN — Medication 40 MILLIGRAM(S): at 05:47

## 2020-11-27 RX ADMIN — Medication 25 MILLIGRAM(S): at 05:47

## 2020-11-27 RX ADMIN — Medication 25 MILLIGRAM(S): at 11:02

## 2020-11-27 RX ADMIN — Medication 25 MILLIGRAM(S): at 17:20

## 2020-11-27 RX ADMIN — Medication 5 MILLIGRAM(S): at 21:12

## 2020-11-27 RX ADMIN — Medication 120 MILLIGRAM(S): at 05:47

## 2020-11-27 RX ADMIN — Medication 25 MILLIGRAM(S): at 23:07

## 2020-11-27 RX ADMIN — RIVAROXABAN 20 MILLIGRAM(S): KIT at 17:20

## 2020-11-27 NOTE — CHART NOTE - NSCHARTNOTEFT_GEN_A_CORE
Patient seen and examined throughout the course of the day.    Pt reports feeling better. She denies palpitations, SOB, CP.  Pt ambulates freely.    # Rapid AFib  - Pt is on Cardizem  mg PO dq and Lopressor 25 mg PO q6h with HR fluctuating btw 80-109s  - will increase Cardizem to 180 mg per Dr. Seaman recommendation  - since AM dose was already given and we don't carry cardizem CD 60 mg, will order first dose starting tomorrow 11/28/20  - will check HR while ambulating  - continue Xarelto 20 qd  - outpatient stress echo. Consider in 4-6 weeks outpatient cardioversion.       # CHF  - continue Lasix 40 mg IV qd    Results of Labs/Imaging discussed as well as patient's plan.    All patient's answered.  Patient encouraged to contact PA with any further issues.     Case d/w Dr. Seaman

## 2020-11-27 NOTE — CDI QUERY NOTE - NSCDIOTHERTXTBX_GEN_ALL_CORE_HH
Clinical Indicators:    11/23 75F w/ swollen feet, LE . CXR>Bilateral opacities/effusions.    BNP 1956 11/27 hospitalist> Rapid afib with CHF, - continue Lasix 40 mg IV qd, Cardizem po, Lopressor po    Based on above clinical findings and your professional judgment please clarify the acuity and type of CHF such as    ?	 Acute Systolic CHF  ?	 Acute Diastolic CHF  ?	Other ( please specify)  ?	Clinically unable to determine

## 2020-11-28 ENCOUNTER — TRANSCRIPTION ENCOUNTER (OUTPATIENT)
Age: 75
End: 2020-11-28

## 2020-11-28 VITALS
RESPIRATION RATE: 16 BRPM | TEMPERATURE: 97 F | DIASTOLIC BLOOD PRESSURE: 74 MMHG | WEIGHT: 164.02 LBS | HEART RATE: 104 BPM | SYSTOLIC BLOOD PRESSURE: 127 MMHG

## 2020-11-28 LAB
ANION GAP SERPL CALC-SCNC: 13 MMOL/L — SIGNIFICANT CHANGE UP (ref 7–14)
BUN SERPL-MCNC: 20 MG/DL — SIGNIFICANT CHANGE UP (ref 10–20)
CALCIUM SERPL-MCNC: 9.3 MG/DL — SIGNIFICANT CHANGE UP (ref 8.5–10.1)
CHLORIDE SERPL-SCNC: 103 MMOL/L — SIGNIFICANT CHANGE UP (ref 98–110)
CO2 SERPL-SCNC: 30 MMOL/L — SIGNIFICANT CHANGE UP (ref 17–32)
CREAT SERPL-MCNC: 1 MG/DL — SIGNIFICANT CHANGE UP (ref 0.7–1.5)
GLUCOSE SERPL-MCNC: 92 MG/DL — SIGNIFICANT CHANGE UP (ref 70–99)
HCT VFR BLD CALC: 48.4 % — HIGH (ref 37–47)
HGB BLD-MCNC: 15.4 G/DL — SIGNIFICANT CHANGE UP (ref 12–16)
MCHC RBC-ENTMCNC: 28.3 PG — SIGNIFICANT CHANGE UP (ref 27–31)
MCHC RBC-ENTMCNC: 31.8 G/DL — LOW (ref 32–37)
MCV RBC AUTO: 88.8 FL — SIGNIFICANT CHANGE UP (ref 81–99)
NRBC # BLD: 0 /100 WBCS — SIGNIFICANT CHANGE UP (ref 0–0)
PLATELET # BLD AUTO: 342 K/UL — SIGNIFICANT CHANGE UP (ref 130–400)
POTASSIUM SERPL-MCNC: 3.7 MMOL/L — SIGNIFICANT CHANGE UP (ref 3.5–5)
POTASSIUM SERPL-SCNC: 3.7 MMOL/L — SIGNIFICANT CHANGE UP (ref 3.5–5)
RBC # BLD: 5.45 M/UL — HIGH (ref 4.2–5.4)
RBC # FLD: 13.3 % — SIGNIFICANT CHANGE UP (ref 11.5–14.5)
SODIUM SERPL-SCNC: 146 MMOL/L — SIGNIFICANT CHANGE UP (ref 135–146)
WBC # BLD: 11.41 K/UL — HIGH (ref 4.8–10.8)
WBC # FLD AUTO: 11.41 K/UL — HIGH (ref 4.8–10.8)

## 2020-11-28 RX ORDER — DILTIAZEM HCL 120 MG
1 CAPSULE, EXT RELEASE 24 HR ORAL
Qty: 30 | Refills: 0
Start: 2020-11-28 | End: 2020-12-27

## 2020-11-28 RX ORDER — METOPROLOL TARTRATE 50 MG
1 TABLET ORAL
Qty: 60 | Refills: 0
Start: 2020-11-28 | End: 2020-12-27

## 2020-11-28 RX ORDER — RIVAROXABAN 15 MG-20MG
1 KIT ORAL
Qty: 0 | Refills: 0 | DISCHARGE
Start: 2020-11-28 | End: 2020-12-27

## 2020-11-28 RX ORDER — RIVAROXABAN 15 MG-20MG
1 KIT ORAL
Qty: 30 | Refills: 0
Start: 2020-11-28 | End: 2020-12-27

## 2020-11-28 RX ADMIN — Medication 180 MILLIGRAM(S): at 05:16

## 2020-11-28 RX ADMIN — Medication 25 MILLIGRAM(S): at 05:16

## 2020-11-28 RX ADMIN — PANTOPRAZOLE SODIUM 40 MILLIGRAM(S): 20 TABLET, DELAYED RELEASE ORAL at 05:16

## 2020-11-28 RX ADMIN — Medication 40 MILLIGRAM(S): at 05:16

## 2020-11-28 NOTE — DISCHARGE NOTE PROVIDER - NSDCFUADDINST_GEN_ALL_CORE_FT
- Please call your PCP or return to Emergency room if develop any persistent fever > 101, persistent nausea/vomiting, severe pain not relieved by pain medication, chest pains, difficulty breathing or any bleeding.

## 2020-11-28 NOTE — CHART NOTE - NSCHARTNOTEFT_GEN_A_CORE
PA notified to discharge patient as per attending.  Case discussed with Dr. Seaman.  Patient stable, no changes.  Patient agreeable to discharge and family notified.         T(C): 35.9 (11-28-20 @ 05:30), Max: 36 (11-27-20 @ 13:57)  HR: 104 (11-28-20 @ 05:30) (99 - 107)  BP: 127/74 (11-28-20 @ 05:30) (109/72 - 127/74)  RR: 16 (11-28-20 @ 05:30) (16 - 16)  SpO2: --        Patient discharged home.   RN aware and will manage.

## 2020-11-28 NOTE — PROGRESS NOTE ADULT - NSHPATTENDINGPLANDISCUSS_GEN_ALL_CORE
HOUSESTAFF AND PATIENT
PATIENT HOUSESTAFF NURSING
housestaff and patient
patient housestaff and  at bedside
patient and housestaff

## 2020-11-28 NOTE — DISCHARGE NOTE NURSING/CASE MANAGEMENT/SOCIAL WORK - PATIENT PORTAL LINK FT
You can access the FollowMyHealth Patient Portal offered by [facility] by registering at the following website: [email or web address]. By joining [employer]’s FollowMyHealth portal, you will also be able to view your health information using other applications (apps) compatible with our system.

## 2020-11-28 NOTE — DISCHARGE NOTE PROVIDER - CARE PROVIDER_API CALL
Grisel Seaman  MEDICINE - CRITICAL CARE  [address] [place] [address]  Phone: [phone]  Fax: [phone]  Follow Up Time: 1-3 days

## 2020-11-28 NOTE — DISCHARGE NOTE PROVIDER - NSDCMRMEDTOKEN_GEN_ALL_CORE_FT
DilTIAZem (Eqv-Cardizem CD) 180 mg/24 hours oral capsule, extended release: 1 cap(s) orally once a day   Lopressor 50 mg oral tablet: 1 tab(s) orally 2 times a day   Xarelto 20 mg oral tablet: 1 tab(s) orally once a day

## 2020-11-28 NOTE — PROGRESS NOTE ADULT - SUBJECTIVE AND OBJECTIVE BOX
patient feeling better less sob hr still running high when ambulating otyherwise HR is controlled feels better no longer weak    `cbc                      15.4   11.41 )-----------( 342      ( 28 Nov 2020 06:50 )             48.4   11-28    146  |  103  |  20  ----------------------------<  92  3.7   |  30  |  1.0    Ca    9.3      28 Nov 2020 06:50

## 2020-11-28 NOTE — DISCHARGE NOTE PROVIDER - NSDCCPCAREPLAN_GEN_ALL_CORE_FT
PRINCIPAL DISCHARGE DIAGNOSIS  Diagnosis: Atrial fibrillation with rapid ventricular response  Assessment and Plan of Treatment: -  -You were found to have an irregularly irregular rhythm called atrial fibrillation.   - You were started on medications metoprolol and cardizem, which controlled your rate and improved your symptoms. Please continue medications for rate control. Prescriptions sent to pharmacy.  - You were also started on a medication for stroke prevention, called Xarelto. Continue medication for stroke prevention as advised. Prescription sent to pharmacy.  - Please follow up with Dr. Seaman on Monday 11/30  or Tuesday 12/1/20  - All prescriptions were sent to your pharmacy. If you have any problem filling prescriptions, please call Dr. Seaman office

## 2020-11-28 NOTE — PROGRESS NOTE ADULT - ASSESSMENT
Rapid afib with CHF  continue lopressor  increase cardiazem to 180mg cd  ambulate
patient in rapid afib ot admission and currently on cardiazem drip  wean to po atenolol   start zarelto 20mg daily GFR 55  continue tele and possible discharge tomorrow if rate is controlled  volume overload improving with lasix
patient in rapid afib ot admission and currently on cardiazem drip  wean to po atenolol patient develo[ping pulmonary congestion heart rate 120's   start zarelto 20mg daily GFR 55  increaSE LASIX TO 40 ivp DAILY  TRIAL OF PO CARDIAZEM PATIENT BACK ON CARDIAZEM DRIP   ECHOCARDIOGRAM
patient in rapid afib ot admission and currently on cardiazem drip  wean to po atenolol patient develo[ping pulmonary congestion heart rate 120's   start zarelto 20mg daily GFR 55  increaSE LASIX TO 40 ivp DAILY  TRIAL OF PO CARDIAZEM PATIENT BACK ON CARDIAZEM DRIP   ECHOCARDIOGRAM  restart cariazem 120 mg CD  hr 75 now   will monitor   increaSE PO AS NEEDED
Rapid afib with CHF  continue lopressor 50mg bid  increase cardiazem to 180mg cd  continue oral anticoagulant  discharge home  follow up in office 2 to 3 days

## 2020-11-28 NOTE — PROGRESS NOTE ADULT - ATTENDING COMMENTS
PATIENT SEEN AND EXAMINED AND CARE PLAN REVIEWED
PATIENT SEEN AND EXAMINED AND CARE PLAN REVISED
patient seen and examined and care plan reviewed
patient seen and examined and care plan revised
patient seen and examined and care plan rewviewed

## 2020-11-29 LAB
CULTURE RESULTS: SIGNIFICANT CHANGE UP
SPECIMEN SOURCE: SIGNIFICANT CHANGE UP

## 2020-12-04 NOTE — CHART NOTE - NSCHARTNOTEFT_GEN_A_CORE
Pt with volume overload on admission.  S/p Lasix 40 mg IV qd.  Pt improved s/p diuresis.  Clinically unable to determine acuity and type of CHF.

## 2020-12-08 ENCOUNTER — TRANSCRIPTION ENCOUNTER (OUTPATIENT)
Age: 75
End: 2020-12-08

## 2020-12-08 DIAGNOSIS — E87.70 FLUID OVERLOAD, UNSPECIFIED: ICD-10-CM

## 2020-12-08 DIAGNOSIS — I48.91 UNSPECIFIED ATRIAL FIBRILLATION: ICD-10-CM

## 2020-12-08 DIAGNOSIS — Z87.891 PERSONAL HISTORY OF NICOTINE DEPENDENCE: ICD-10-CM

## 2020-12-08 DIAGNOSIS — I50.9 HEART FAILURE, UNSPECIFIED: ICD-10-CM

## 2021-04-09 ENCOUNTER — APPOINTMENT (OUTPATIENT)
Dept: CARDIOLOGY | Facility: CLINIC | Age: 76
End: 2021-04-09
Payer: MEDICARE

## 2021-04-09 VITALS
HEIGHT: 66 IN | TEMPERATURE: 97 F | SYSTOLIC BLOOD PRESSURE: 120 MMHG | BODY MASS INDEX: 27 KG/M2 | WEIGHT: 168 LBS | DIASTOLIC BLOOD PRESSURE: 86 MMHG | HEART RATE: 76 BPM

## 2021-04-09 DIAGNOSIS — Z87.891 PERSONAL HISTORY OF NICOTINE DEPENDENCE: ICD-10-CM

## 2021-04-09 PROCEDURE — 99204 OFFICE O/P NEW MOD 45 MIN: CPT

## 2021-04-09 PROCEDURE — 99072 ADDL SUPL MATRL&STAF TM PHE: CPT

## 2021-04-09 PROCEDURE — 93000 ELECTROCARDIOGRAM COMPLETE: CPT

## 2021-04-09 NOTE — PHYSICAL EXAM
[General Appearance - Well Developed] : well developed [Normal Appearance] : normal appearance [Well Groomed] : well groomed [General Appearance - Well Nourished] : well nourished [No Deformities] : no deformities [General Appearance - In No Acute Distress] : no acute distress [Normal Conjunctiva] : the conjunctiva exhibited no abnormalities [Eyelids - No Xanthelasma] : the eyelids demonstrated no xanthelasmas [Normal Oral Mucosa] : normal oral mucosa [No Oral Pallor] : no oral pallor [No Oral Cyanosis] : no oral cyanosis [Normal Rate] : normal [Rhythm Regular] : regular [No Murmur] : no murmurs heard [2+] : left 2+ [No Pitting Edema] : no pitting edema present [Respiration, Rhythm And Depth] : normal respiratory rhythm and effort [Exaggerated Use Of Accessory Muscles For Inspiration] : no accessory muscle use [Auscultation Breath Sounds / Voice Sounds] : lungs were clear to auscultation bilaterally [Abdomen Soft] : soft [Abdomen Tenderness] : non-tender [Abdomen Mass (___ Cm)] : no abdominal mass palpated [Abnormal Walk] : normal gait [Gait - Sufficient For Exercise Testing] : the gait was sufficient for exercise testing [Skin Color & Pigmentation] : normal skin color and pigmentation [] : no rash [No Venous Stasis] : no venous stasis [Skin Lesions] : no skin lesions [No Skin Ulcers] : no skin ulcer [No Xanthoma] : no  xanthoma was observed [Oriented To Time, Place, And Person] : oriented to person, place, and time [Affect] : the affect was normal [Mood] : the mood was normal [No Anxiety] : not feeling anxious [FreeTextEntry1] : No JVD

## 2021-04-09 NOTE — ASSESSMENT
[FreeTextEntry1] : The patient has AF which is persistent . There is moderate  LA dilation . The patient has had no symptoms since admission . Her only symptoms was leg swelling . The patient had an e GRF anywhere from 51-55 during recent hosptialization . She is on diltiazem which had been decreased and my need to come down to 15 mg of Xarelto . I have discussed options with the patient including maintaining AF and rate control vs. LEAENN and DCCV . The patient has normal lv systolic function . The patient prefers to maintain rate contro and A/C strategy .

## 2021-04-09 NOTE — HISTORY OF PRESENT ILLNESS
[FreeTextEntry1] : The patient had edema and was seen by her PCP in November . The patient was found to be in AF with RVR . The patient was given medication to maintain Ventricular rate . The patient has being feeling well since this had occurred . Some fatigue . No SOB . No CP . The patient had pulmonary congestion .

## 2021-04-12 ENCOUNTER — NON-APPOINTMENT (OUTPATIENT)
Age: 76
End: 2021-04-12

## 2021-05-07 ENCOUNTER — OUTPATIENT (OUTPATIENT)
Dept: OUTPATIENT SERVICES | Facility: HOSPITAL | Age: 76
LOS: 1 days | Discharge: HOME | End: 2021-05-07
Payer: MEDICARE

## 2021-05-07 VITALS
HEIGHT: 66 IN | WEIGHT: 160.06 LBS | DIASTOLIC BLOOD PRESSURE: 93 MMHG | HEART RATE: 72 BPM | SYSTOLIC BLOOD PRESSURE: 145 MMHG | TEMPERATURE: 98 F | RESPIRATION RATE: 17 BRPM | OXYGEN SATURATION: 96 %

## 2021-05-07 DIAGNOSIS — Z98.890 OTHER SPECIFIED POSTPROCEDURAL STATES: Chronic | ICD-10-CM

## 2021-05-07 DIAGNOSIS — I48.91 UNSPECIFIED ATRIAL FIBRILLATION: ICD-10-CM

## 2021-05-07 DIAGNOSIS — Z01.818 ENCOUNTER FOR OTHER PREPROCEDURAL EXAMINATION: ICD-10-CM

## 2021-05-07 LAB
ALBUMIN SERPL ELPH-MCNC: 4.3 G/DL — SIGNIFICANT CHANGE UP (ref 3.5–5.2)
ALP SERPL-CCNC: 85 U/L — SIGNIFICANT CHANGE UP (ref 30–115)
ALT FLD-CCNC: 14 U/L — SIGNIFICANT CHANGE UP (ref 0–41)
ANION GAP SERPL CALC-SCNC: 6 MMOL/L — LOW (ref 7–14)
APTT BLD: 41.9 SEC — HIGH (ref 27–39.2)
AST SERPL-CCNC: 14 U/L — SIGNIFICANT CHANGE UP (ref 0–41)
BASOPHILS # BLD AUTO: 0.1 K/UL — SIGNIFICANT CHANGE UP (ref 0–0.2)
BASOPHILS NFR BLD AUTO: 1.1 % — HIGH (ref 0–1)
BILIRUB SERPL-MCNC: 0.6 MG/DL — SIGNIFICANT CHANGE UP (ref 0.2–1.2)
BUN SERPL-MCNC: 16 MG/DL — SIGNIFICANT CHANGE UP (ref 10–20)
CALCIUM SERPL-MCNC: 9.4 MG/DL — SIGNIFICANT CHANGE UP (ref 8.5–10.1)
CHLORIDE SERPL-SCNC: 107 MMOL/L — SIGNIFICANT CHANGE UP (ref 98–110)
CO2 SERPL-SCNC: 29 MMOL/L — SIGNIFICANT CHANGE UP (ref 17–32)
CREAT SERPL-MCNC: 1 MG/DL — SIGNIFICANT CHANGE UP (ref 0.7–1.5)
EOSINOPHIL # BLD AUTO: 0.21 K/UL — SIGNIFICANT CHANGE UP (ref 0–0.7)
EOSINOPHIL NFR BLD AUTO: 2.2 % — SIGNIFICANT CHANGE UP (ref 0–8)
GLUCOSE SERPL-MCNC: 76 MG/DL — SIGNIFICANT CHANGE UP (ref 70–99)
HCT VFR BLD CALC: 48.2 % — HIGH (ref 37–47)
HGB BLD-MCNC: 15.8 G/DL — SIGNIFICANT CHANGE UP (ref 12–16)
IMM GRANULOCYTES NFR BLD AUTO: 0.7 % — HIGH (ref 0.1–0.3)
INR BLD: 1.5 RATIO — HIGH (ref 0.65–1.3)
LYMPHOCYTES # BLD AUTO: 2.02 K/UL — SIGNIFICANT CHANGE UP (ref 1.2–3.4)
LYMPHOCYTES # BLD AUTO: 21.3 % — SIGNIFICANT CHANGE UP (ref 20.5–51.1)
MCHC RBC-ENTMCNC: 28.7 PG — SIGNIFICANT CHANGE UP (ref 27–31)
MCHC RBC-ENTMCNC: 32.8 G/DL — SIGNIFICANT CHANGE UP (ref 32–37)
MCV RBC AUTO: 87.5 FL — SIGNIFICANT CHANGE UP (ref 81–99)
MONOCYTES # BLD AUTO: 0.53 K/UL — SIGNIFICANT CHANGE UP (ref 0.1–0.6)
MONOCYTES NFR BLD AUTO: 5.6 % — SIGNIFICANT CHANGE UP (ref 1.7–9.3)
NEUTROPHILS # BLD AUTO: 6.57 K/UL — HIGH (ref 1.4–6.5)
NEUTROPHILS NFR BLD AUTO: 69.1 % — SIGNIFICANT CHANGE UP (ref 42.2–75.2)
NRBC # BLD: 0 /100 WBCS — SIGNIFICANT CHANGE UP (ref 0–0)
PLATELET # BLD AUTO: 304 K/UL — SIGNIFICANT CHANGE UP (ref 130–400)
POTASSIUM SERPL-MCNC: 4.4 MMOL/L — SIGNIFICANT CHANGE UP (ref 3.5–5)
POTASSIUM SERPL-SCNC: 4.4 MMOL/L — SIGNIFICANT CHANGE UP (ref 3.5–5)
PROT SERPL-MCNC: 6.7 G/DL — SIGNIFICANT CHANGE UP (ref 6–8)
PROTHROM AB SERPL-ACNC: 17.2 SEC — HIGH (ref 9.95–12.87)
RBC # BLD: 5.51 M/UL — HIGH (ref 4.2–5.4)
RBC # FLD: 13.5 % — SIGNIFICANT CHANGE UP (ref 11.5–14.5)
SODIUM SERPL-SCNC: 142 MMOL/L — SIGNIFICANT CHANGE UP (ref 135–146)
WBC # BLD: 9.5 K/UL — SIGNIFICANT CHANGE UP (ref 4.8–10.8)
WBC # FLD AUTO: 9.5 K/UL — SIGNIFICANT CHANGE UP (ref 4.8–10.8)

## 2021-05-07 PROCEDURE — 93010 ELECTROCARDIOGRAM REPORT: CPT

## 2021-05-07 NOTE — H&P PST ADULT - HISTORY OF PRESENT ILLNESS
PATIENT DENIES CHEST PAIN, SHORTNESS OF BREATH, PALPITATIONS, COUGHING, FEVER, DYSURIA.  CANNOT WALK UP ANY STEPS WITHOUT SOB. REPORTS DACOSTA.    NO COUGH, FEVER, SORE THROAT, HEADACHE, LOSS OF TASTE OR SMELL. NO KNOWN EXPOSURE TO ANYONE WITH COVID. PATIENT WAS INSTRUCTED TO ISOLATE FROM NOW UNTIL THE SURGERY.    Anesthesia Alert  NO--Difficult Airway  NO--History of neck surgery or radiation  NO--Limited ROM of neck  NO--History of Malignant hyperthermia  NO--Personal or family history of Pseudocholinesterase deficiency  NO--Prior Anesthesia Complication  NO--Latex Allergy  NO--Loose teeth  NO--History of Rheumatoid Arthritis  NO--LEVI

## 2021-05-07 NOTE — H&P PST ADULT - PRIMARY CARE PROVIDER
Date last seen: 10/9/20  Date of next visit: NA    Medication Requested:     Outpatient Current Medications as of as of 12/3/2020       Disp Refills Start End    lisdexamfetamine (Vyvanse) 30 MG capsule 30 capsule 0 11/6/2020     Sig - Route: Take 1 capsule by mouth every morning. - Oral    Class: Eprescribe     BP Readings from Last 1 Encounters:   10/09/20 110/62          SNOW (LV 4/8/21)

## 2021-05-07 NOTE — H&P PST ADULT - NSICDXFAMILYHX_GEN_ALL_CORE_FT
FAMILY HISTORY:  Father  Still living? Unknown  FH: prostate cancer, Age at diagnosis: Age Unknown

## 2021-05-07 NOTE — H&P PST ADULT - REASON FOR ADMISSION
75 Y/O FEMALE HERE FOR PRE-ADMISSION SURGICAL TESTING. PATIENT REPORTS SHE WENT INTO AFIB 11/2020. SHE STATES THE AFIB HAS BEEN CAUSING HER INCREASED FATIGUE. SHE STATES SHE NEEDS A CARDIOVERSION. THIS WILL ALLOW HER TO DECREASE THE AMOUNT OF MEDS SHE'S ON.  NOW FOR SCHEDULED PROCEDURE.

## 2021-05-09 ENCOUNTER — OUTPATIENT (OUTPATIENT)
Dept: OUTPATIENT SERVICES | Facility: HOSPITAL | Age: 76
LOS: 1 days | Discharge: HOME | End: 2021-05-09

## 2021-05-09 ENCOUNTER — LABORATORY RESULT (OUTPATIENT)
Age: 76
End: 2021-05-09

## 2021-05-09 DIAGNOSIS — Z11.59 ENCOUNTER FOR SCREENING FOR OTHER VIRAL DISEASES: ICD-10-CM

## 2021-05-09 DIAGNOSIS — Z98.890 OTHER SPECIFIED POSTPROCEDURAL STATES: Chronic | ICD-10-CM

## 2021-05-09 PROBLEM — I48.91 UNSPECIFIED ATRIAL FIBRILLATION: Chronic | Status: ACTIVE | Noted: 2021-05-07

## 2021-05-11 ENCOUNTER — FORM ENCOUNTER (OUTPATIENT)
Age: 76
End: 2021-05-11

## 2021-05-12 ENCOUNTER — OUTPATIENT (OUTPATIENT)
Dept: OUTPATIENT SERVICES | Facility: HOSPITAL | Age: 76
LOS: 1 days | Discharge: HOME | End: 2021-05-12
Payer: MEDICARE

## 2021-05-12 VITALS
TEMPERATURE: 97 F | OXYGEN SATURATION: 92 % | WEIGHT: 154.98 LBS | HEART RATE: 76 BPM | RESPIRATION RATE: 16 BRPM | HEIGHT: 66 IN

## 2021-05-12 DIAGNOSIS — Z98.890 OTHER SPECIFIED POSTPROCEDURAL STATES: Chronic | ICD-10-CM

## 2021-05-12 DIAGNOSIS — I48.91 UNSPECIFIED ATRIAL FIBRILLATION: ICD-10-CM

## 2021-05-12 DIAGNOSIS — M51.26 OTHER INTERVERTEBRAL DISC DISPLACEMENT, LUMBAR REGION: Chronic | ICD-10-CM

## 2021-05-12 PROCEDURE — 92960 CARDIOVERSION ELECTRIC EXT: CPT

## 2021-05-12 PROCEDURE — 93010 ELECTROCARDIOGRAM REPORT: CPT

## 2021-05-12 RX ORDER — DILTIAZEM HCL 120 MG
1 CAPSULE, EXT RELEASE 24 HR ORAL
Qty: 0 | Refills: 0 | DISCHARGE

## 2021-05-12 NOTE — CHART NOTE - NSCHARTNOTEFT_GEN_A_CORE
POST OPERATIVE PROCEDURAL DOCUMENTATION  PRE-OP DIAGNOSIS: Atrial fibrillation     POST-OP DIAGNOSIS: Atrial fibrillation with successful cardioversion to normal sinus rhythm    PROCEDURE: Transesophageal echocardiogram and cardioversion    Primary Physician: Dr. Bergman  Assistant: Damaris    ANESTHESIA TYPE  [  ] General Anesthesia  [ x ] Conscious Sedation  [  ] Local/Regional    CONDITION  [  ] Critical  [  ] Serious  [x] Fair  [  ] Good    SPECIMENS REMOVED (IF APPLICABLE): N/A    IMPLANTS (IF APPLICABLE): None    ESTIMATED BLOOD LOSS: None    COMPLICATIONS: None      FINDINGS:    After risks and benefits of procedures were explained, informed consent was obtained and placed in chart. Refer to Anesthesia note for sedation details.  The LEEANN probe was passed into the esophagus without difficulty.  Transesophageal and transgastric images were obtained.  The LEEANN probe was removed without difficulty and examined.  There was no evidence for bleeding.  The patient tolerated the procedure well without any immediate LEEANN-related complications.      Preliminary Findings:  LA: Dilated.  ANASTASIA: Left atrial appendage was clear of clot and spontaneous echo contrast. Decreased velocities across ANASTASIA.  LV: LVEF was estimated at 30-35%.  RV: Normal size and systolic function.  MV: Mod to severe MR, No evidence for MS.   AV: Mild AI, no evidence for AS.   TV: Mild-Mod TR.   PV: No PI.  IAS: No PFO. NO R-> L shunt on color doppler and injection of agitated saline contrast.   There was mild, non-mobile atheroma seen in the thoracic aorta.     Patient successfully converted to sinus rhythm with synchronized  200 J of direct current cardioversion via AP pads.    DIAGNOSIS/IMPRESSION:  Atrial fibrillation with successful cardioversion to normal sinus rhythm    PLAN OF CARE:  [x] Continue anticoagulation xarelto.  [x] Continue metoprolol succinate 100 mg po daily but discontinue cardizem CD upon discharge.    Results of procedure/ plan of care discussed with patient/  in detail. POST OPERATIVE PROCEDURAL DOCUMENTATION  PRE-OP DIAGNOSIS: Atrial fibrillation     POST-OP DIAGNOSIS: Atrial fibrillation with successful cardioversion to normal sinus rhythm    PROCEDURE: Transesophageal echocardiogram and cardioversion    Primary Physician: Dr. Bergman  Assistant: Damaris    ANESTHESIA TYPE  [  ] General Anesthesia  [ x ] Conscious Sedation  [  ] Local/Regional    CONDITION  [  ] Critical  [  ] Serious  [x] Fair  [  ] Good    SPECIMENS REMOVED (IF APPLICABLE): N/A    IMPLANTS (IF APPLICABLE): None    ESTIMATED BLOOD LOSS: None    COMPLICATIONS: None      FINDINGS:    After risks and benefits of procedures were explained, informed consent was obtained and placed in chart. Refer to Anesthesia note for sedation details.  The ELEANN probe was passed into the esophagus without difficulty.  Transesophageal and transgastric images were obtained.  The LEEANN probe was removed without difficulty and examined.  There was no evidence for bleeding.  The patient tolerated the procedure well without any immediate LEEANN-related complications.      Preliminary Findings:  LA: Dilated.  ANASTASIA: Left atrial appendage was clear of clot and spontaneous echo contrast. Decreased velocities across ANASTASIA.  LV: LVEF was estimated at 30-35%.  RV: Normal size and systolic function.  MV: Moderate to Severe MR. No evidence for MS.   AV: Mild AI, no evidence for AS.   TV: Mild-Mod TR.   PV: No PI.  IAS: No PFO. NO R-> L shunt on color doppler and injection of agitated saline contrast.   There was mild, non-mobile atheroma seen in the thoracic aorta.     Patient successfully converted to sinus rhythm with synchronized  200 J of direct current cardioversion via AP pads.    DIAGNOSIS/IMPRESSION:  Atrial fibrillation with successful cardioversion to normal sinus rhythm. Moderate to severe mitral regurgitation.    PLAN OF CARE:  [x] Continue anticoagulation xarelto.  [x] Continue metoprolol succinate 100 mg po daily but discontinue cardizem CD upon discharge.    Results of procedure/ plan of care discussed with patient/  in detail.

## 2021-05-13 ENCOUNTER — INPATIENT (INPATIENT)
Facility: HOSPITAL | Age: 76
LOS: 4 days | Discharge: HOME | End: 2021-05-18
Attending: HOSPITALIST | Admitting: HOSPITALIST
Payer: MEDICARE

## 2021-05-13 VITALS
SYSTOLIC BLOOD PRESSURE: 196 MMHG | OXYGEN SATURATION: 86 % | DIASTOLIC BLOOD PRESSURE: 99 MMHG | RESPIRATION RATE: 20 BRPM | HEIGHT: 66 IN | HEART RATE: 85 BPM | TEMPERATURE: 98 F

## 2021-05-13 DIAGNOSIS — Z98.890 OTHER SPECIFIED POSTPROCEDURAL STATES: Chronic | ICD-10-CM

## 2021-05-13 DIAGNOSIS — M51.26 OTHER INTERVERTEBRAL DISC DISPLACEMENT, LUMBAR REGION: Chronic | ICD-10-CM

## 2021-05-13 PROCEDURE — 99285 EMERGENCY DEPT VISIT HI MDM: CPT

## 2021-05-14 ENCOUNTER — APPOINTMENT (OUTPATIENT)
Dept: CARDIOLOGY | Facility: CLINIC | Age: 76
End: 2021-05-14

## 2021-05-14 LAB
ALBUMIN SERPL ELPH-MCNC: 4.3 G/DL — SIGNIFICANT CHANGE UP (ref 3.5–5.2)
ALP SERPL-CCNC: 93 U/L — SIGNIFICANT CHANGE UP (ref 30–115)
ALT FLD-CCNC: 12 U/L — SIGNIFICANT CHANGE UP (ref 0–41)
ANION GAP SERPL CALC-SCNC: 14 MMOL/L — SIGNIFICANT CHANGE UP (ref 7–14)
AST SERPL-CCNC: 18 U/L — SIGNIFICANT CHANGE UP (ref 0–41)
BASE EXCESS BLDV CALC-SCNC: 5.6 MMOL/L — HIGH (ref -2–2)
BASOPHILS # BLD AUTO: 0.11 K/UL — SIGNIFICANT CHANGE UP (ref 0–0.2)
BASOPHILS NFR BLD AUTO: 0.8 % — SIGNIFICANT CHANGE UP (ref 0–1)
BILIRUB SERPL-MCNC: 0.4 MG/DL — SIGNIFICANT CHANGE UP (ref 0.2–1.2)
BUN SERPL-MCNC: 20 MG/DL — SIGNIFICANT CHANGE UP (ref 10–20)
CA-I SERPL-SCNC: 1.2 MMOL/L — SIGNIFICANT CHANGE UP (ref 1.12–1.3)
CALCIUM SERPL-MCNC: 9.5 MG/DL — SIGNIFICANT CHANGE UP (ref 8.5–10.1)
CHLORIDE SERPL-SCNC: 105 MMOL/L — SIGNIFICANT CHANGE UP (ref 98–110)
CO2 SERPL-SCNC: 23 MMOL/L — SIGNIFICANT CHANGE UP (ref 17–32)
CREAT SERPL-MCNC: 1.1 MG/DL — SIGNIFICANT CHANGE UP (ref 0.7–1.5)
EOSINOPHIL # BLD AUTO: 0.17 K/UL — SIGNIFICANT CHANGE UP (ref 0–0.7)
EOSINOPHIL NFR BLD AUTO: 1.3 % — SIGNIFICANT CHANGE UP (ref 0–8)
GAS PNL BLDV: 141 MMOL/L — SIGNIFICANT CHANGE UP (ref 136–145)
GAS PNL BLDV: SIGNIFICANT CHANGE UP
GLUCOSE SERPL-MCNC: 143 MG/DL — HIGH (ref 70–99)
HCO3 BLDV-SCNC: 33 MMOL/L — HIGH (ref 22–29)
HCT VFR BLD CALC: 44.9 % — SIGNIFICANT CHANGE UP (ref 37–47)
HCT VFR BLDA CALC: 46.3 % — HIGH (ref 34–44)
HGB BLD CALC-MCNC: 15.1 G/DL — SIGNIFICANT CHANGE UP (ref 14–18)
HGB BLD-MCNC: 14.7 G/DL — SIGNIFICANT CHANGE UP (ref 12–16)
IMM GRANULOCYTES NFR BLD AUTO: 0.8 % — HIGH (ref 0.1–0.3)
LACTATE BLDV-MCNC: 1.3 MMOL/L — SIGNIFICANT CHANGE UP (ref 0.5–1.6)
LYMPHOCYTES # BLD AUTO: 1.28 K/UL — SIGNIFICANT CHANGE UP (ref 1.2–3.4)
LYMPHOCYTES # BLD AUTO: 9.6 % — LOW (ref 20.5–51.1)
MCHC RBC-ENTMCNC: 28.7 PG — SIGNIFICANT CHANGE UP (ref 27–31)
MCHC RBC-ENTMCNC: 32.7 G/DL — SIGNIFICANT CHANGE UP (ref 32–37)
MCV RBC AUTO: 87.7 FL — SIGNIFICANT CHANGE UP (ref 81–99)
MONOCYTES # BLD AUTO: 0.91 K/UL — HIGH (ref 0.1–0.6)
MONOCYTES NFR BLD AUTO: 6.8 % — SIGNIFICANT CHANGE UP (ref 1.7–9.3)
NEUTROPHILS # BLD AUTO: 10.76 K/UL — HIGH (ref 1.4–6.5)
NEUTROPHILS NFR BLD AUTO: 80.7 % — HIGH (ref 42.2–75.2)
NRBC # BLD: 0 /100 WBCS — SIGNIFICANT CHANGE UP (ref 0–0)
NT-PROBNP SERPL-SCNC: 1297 PG/ML — HIGH (ref 0–300)
PCO2 BLDV: 56 MMHG — HIGH (ref 41–51)
PH BLDV: 7.37 — SIGNIFICANT CHANGE UP (ref 7.26–7.43)
PLATELET # BLD AUTO: 292 K/UL — SIGNIFICANT CHANGE UP (ref 130–400)
PO2 BLDV: 19 MMHG — LOW (ref 20–40)
POTASSIUM BLDV-SCNC: 4.4 MMOL/L — SIGNIFICANT CHANGE UP (ref 3.3–5.6)
POTASSIUM SERPL-MCNC: 4.3 MMOL/L — SIGNIFICANT CHANGE UP (ref 3.5–5)
POTASSIUM SERPL-SCNC: 4.3 MMOL/L — SIGNIFICANT CHANGE UP (ref 3.5–5)
PROT SERPL-MCNC: 6.5 G/DL — SIGNIFICANT CHANGE UP (ref 6–8)
RBC # BLD: 5.12 M/UL — SIGNIFICANT CHANGE UP (ref 4.2–5.4)
RBC # FLD: 13 % — SIGNIFICANT CHANGE UP (ref 11.5–14.5)
SAO2 % BLDV: 23 % — SIGNIFICANT CHANGE UP
SARS-COV-2 RNA SPEC QL NAA+PROBE: SIGNIFICANT CHANGE UP
SODIUM SERPL-SCNC: 142 MMOL/L — SIGNIFICANT CHANGE UP (ref 135–146)
TROPONIN T SERPL-MCNC: <0.01 NG/ML — SIGNIFICANT CHANGE UP
WBC # BLD: 13.33 K/UL — HIGH (ref 4.8–10.8)
WBC # FLD AUTO: 13.33 K/UL — HIGH (ref 4.8–10.8)

## 2021-05-14 PROCEDURE — 71045 X-RAY EXAM CHEST 1 VIEW: CPT | Mod: 26

## 2021-05-14 PROCEDURE — 93010 ELECTROCARDIOGRAM REPORT: CPT

## 2021-05-14 PROCEDURE — 99222 1ST HOSP IP/OBS MODERATE 55: CPT

## 2021-05-14 PROCEDURE — 99223 1ST HOSP IP/OBS HIGH 75: CPT

## 2021-05-14 RX ORDER — AMPICILLIN SODIUM AND SULBACTAM SODIUM 250; 125 MG/ML; MG/ML
3 INJECTION, POWDER, FOR SUSPENSION INTRAMUSCULAR; INTRAVENOUS EVERY 6 HOURS
Refills: 0 | Status: DISCONTINUED | OUTPATIENT
Start: 2021-05-14 | End: 2021-05-14

## 2021-05-14 RX ORDER — RIVAROXABAN 15 MG-20MG
20 KIT ORAL
Refills: 0 | Status: DISCONTINUED | OUTPATIENT
Start: 2021-05-14 | End: 2021-05-18

## 2021-05-14 RX ORDER — AMPICILLIN SODIUM AND SULBACTAM SODIUM 250; 125 MG/ML; MG/ML
3 INJECTION, POWDER, FOR SUSPENSION INTRAMUSCULAR; INTRAVENOUS ONCE
Refills: 0 | Status: DISCONTINUED | OUTPATIENT
Start: 2021-05-14 | End: 2021-05-14

## 2021-05-14 RX ORDER — PANTOPRAZOLE SODIUM 20 MG/1
40 TABLET, DELAYED RELEASE ORAL
Refills: 0 | Status: DISCONTINUED | OUTPATIENT
Start: 2021-05-14 | End: 2021-05-14

## 2021-05-14 RX ORDER — CHLORHEXIDINE GLUCONATE 213 G/1000ML
1 SOLUTION TOPICAL
Refills: 0 | Status: DISCONTINUED | OUTPATIENT
Start: 2021-05-14 | End: 2021-05-18

## 2021-05-14 RX ORDER — METOPROLOL TARTRATE 50 MG
100 TABLET ORAL DAILY
Refills: 0 | Status: DISCONTINUED | OUTPATIENT
Start: 2021-05-14 | End: 2021-05-14

## 2021-05-14 RX ORDER — RIVAROXABAN 15 MG-20MG
15 KIT ORAL
Refills: 0 | Status: DISCONTINUED | OUTPATIENT
Start: 2021-05-14 | End: 2021-05-14

## 2021-05-14 RX ORDER — AZITHROMYCIN 500 MG/1
500 TABLET, FILM COATED ORAL EVERY 24 HOURS
Refills: 0 | Status: DISCONTINUED | OUTPATIENT
Start: 2021-05-14 | End: 2021-05-14

## 2021-05-14 RX ORDER — CEFTRIAXONE 500 MG/1
1000 INJECTION, POWDER, FOR SOLUTION INTRAMUSCULAR; INTRAVENOUS EVERY 24 HOURS
Refills: 0 | Status: DISCONTINUED | OUTPATIENT
Start: 2021-05-14 | End: 2021-05-14

## 2021-05-14 RX ORDER — FUROSEMIDE 40 MG
40 TABLET ORAL ONCE
Refills: 0 | Status: COMPLETED | OUTPATIENT
Start: 2021-05-14 | End: 2021-05-14

## 2021-05-14 RX ORDER — DILTIAZEM HCL 120 MG
120 CAPSULE, EXT RELEASE 24 HR ORAL DAILY
Refills: 0 | Status: DISCONTINUED | OUTPATIENT
Start: 2021-05-14 | End: 2021-05-14

## 2021-05-14 RX ORDER — DILTIAZEM HCL 120 MG
30 CAPSULE, EXT RELEASE 24 HR ORAL EVERY 6 HOURS
Refills: 0 | Status: DISCONTINUED | OUTPATIENT
Start: 2021-05-14 | End: 2021-05-14

## 2021-05-14 RX ORDER — METOPROLOL TARTRATE 50 MG
100 TABLET ORAL
Refills: 0 | Status: DISCONTINUED | OUTPATIENT
Start: 2021-05-14 | End: 2021-05-17

## 2021-05-14 RX ORDER — AMPICILLIN SODIUM AND SULBACTAM SODIUM 250; 125 MG/ML; MG/ML
INJECTION, POWDER, FOR SUSPENSION INTRAMUSCULAR; INTRAVENOUS
Refills: 0 | Status: DISCONTINUED | OUTPATIENT
Start: 2021-05-14 | End: 2021-05-14

## 2021-05-14 RX ORDER — CEFTRIAXONE 500 MG/1
1000 INJECTION, POWDER, FOR SOLUTION INTRAMUSCULAR; INTRAVENOUS EVERY 24 HOURS
Refills: 0 | Status: DISCONTINUED | OUTPATIENT
Start: 2021-05-15 | End: 2021-05-16

## 2021-05-14 RX ADMIN — CHLORHEXIDINE GLUCONATE 1 APPLICATION(S): 213 SOLUTION TOPICAL at 06:01

## 2021-05-14 RX ADMIN — Medication 120 MILLIGRAM(S): at 11:25

## 2021-05-14 RX ADMIN — AMPICILLIN SODIUM AND SULBACTAM SODIUM 200 GRAM(S): 250; 125 INJECTION, POWDER, FOR SUSPENSION INTRAMUSCULAR; INTRAVENOUS at 11:26

## 2021-05-14 RX ADMIN — Medication 100 MILLIGRAM(S): at 06:00

## 2021-05-14 RX ADMIN — AMPICILLIN SODIUM AND SULBACTAM SODIUM 200 GRAM(S): 250; 125 INJECTION, POWDER, FOR SUSPENSION INTRAMUSCULAR; INTRAVENOUS at 17:21

## 2021-05-14 RX ADMIN — Medication 100 MILLIGRAM(S): at 17:21

## 2021-05-14 RX ADMIN — RIVAROXABAN 20 MILLIGRAM(S): KIT at 17:21

## 2021-05-14 RX ADMIN — Medication 40 MILLIGRAM(S): at 02:09

## 2021-05-14 NOTE — CONSULT NOTE ADULT - SUBJECTIVE AND OBJECTIVE BOX
Date of Admission: 05-14-21    CHIEF COMPLAINT: Patient is a 76y old  Female who presents with a chief complaint of Hypoxia (14 May 2021 02:32)      HPI:  76 years old female w/ Pmhx of HFrEF, Afib (on xarelto) s/p LEEANN/DCCV by Dr. Bergman 5/12 successfully yesterday came in with shortness of breath.  History of presenting illness goes back to one day ago when patient underwent LEEANN/DCCV for new onset atrial fibrillation. Pt was feeling fine after the procedure until yesterday night when she developed sudden onset SOB around 8:30pm while sitting on couch watching TV. Reports associated wheezing and dry cough since yesterday. Pt was unable to bretah and hence came to ED. Pt denies n/v/chest pain, diaphoresis, orthopnea, fever, chills cough, abdomen or back pain or lower extremity swelling/     In the ED, /99, HR 85, temp 98.5F, RR 20 saturating 86% on room air and now placed on 3L NC. Labs significant for WBC 13.33. CXR shows right basilar opacity. Pt received lasix in ED. To be admitted under medicine for further workup.   (14 May 2021 02:32)      PAST MEDICAL & SURGICAL HISTORY:  Afib    H/O varicose vein stripping    Lumbar herniated disc        FAMILY HISTORY:  FH: prostate cancer (Father)  father    [x] no pertinent family history of premature cardiovascular disease in first degree relatives.    SOCIAL HISTORY:    [x] Ex-Smoker  [x] No alcohol use  [x] No illicit drug use    Allergies: No Known Allergies    REVIEW OF SYSTEMS:  CONSTITUTIONAL: No fever, weight loss, or fatigue  CARDIOLOGY: No chest pain, dyspnea of exertion, or syncopal episodes.   RESPIRATORY:  (+) Shortness of breath, cough, wheezing.   NEUROLOGICAL: No weakness, no focal deficits to report.  GI: No BRBPR, no N,V,diarrhea.    PSYCHIATRY: Normal mood and affect.  HEENT: No nasal discharge, no ecchymosis  SKIN: No ecchymosis, no breakdown  MUSCULOSKELETAL: Full range of motion x4.   EXTREM: No leg swelling or erythema.    PHYSICAL EXAM:  T(C): 36.9 (05-13-21 @ 23:05), Max: 36.9 (05-13-21 @ 23:05)  HR: 62 (05-14-21 @ 02:11) (62 - 85)  BP: 121/57 (05-14-21 @ 02:11) (121/57 - 196/99)  RR: 18 (05-14-21 @ 01:00) (18 - 20)  SpO2: 96% (05-14-21 @ 02:11) (86% - 98%)  Wt(kg): --  I&O's Summary    13 May 2021 07:01  -  14 May 2021 06:28  --------------------------------------------------------  IN: 0 mL / OUT: 550 mL / NET: -550 mL        General Appearance: NAD, normal for age and gender. 	  Neck: Normal JVP, no bruit.   Eyes: No xanthomalasia, Extra Ocular muscles intact.   Cardiovascular: Regular rate and rhythm S1 S2, No JVD, No murmurs.  Respiratory: Lungs clear to auscultation. No wheezes, rales or rhonchi.  Psychiatry: Alert and oriented x 3, Mood & affect appropriate  Gastrointestinal:  Soft, Non-tender  Skin/Integumen: No rashes, No ecchymoses, No cyanosis	  Neurologic: Non-focal deficits.  Musculoskeletal/ extremities: Normal range of motion, No clubbing, cyanosis or edema  Vascular: Peripheral pulses palpable 2+ bilaterally    LABS:	 	                        14.7   13.33 )-----------( 292      ( 14 May 2021 00:00 )             44.9     05-14    142  |  105  |  20  ----------------------------<  143<H>  4.3   |  23  |  1.1    Ca    9.5      14 May 2021 00:00    TPro  6.5  /  Alb  4.3  /  TBili  0.4  /  DBili  x   /  AST  18  /  ALT  12  /  AlkPhos  93  05-14    CARDIAC MARKERS ( 14 May 2021 00:00 )  x     / <0.01 ng/mL / x     / x     / x        TELEMETRY EVENTS: 	    ECG:  < from: 12 Lead ECG (05.14.21 @ 00:48) >  Diagnosis Line Normal sinus rhythm  Rightward axis  Septal infarct , age undetermined  Abnormal ECG  	  RADIOLOGY:   OTHER: 	    PREVIOUS DIAGNOSTIC TESTING:    [x] Echocardiogram: 5/12  LEEANN    Preliminary Findings:  LA: Dilated.  ANASTASIA: Left atrial appendage was clear of clot and spontaneous echo contrast. Decreased velocities across ANASTASIA.  LV: LVEF was estimated at 30-35%.  RV: Normal size and systolic function.  MV: Moderate to Severe MR. No evidence for MS.   AV: Mild AI, no evidence for AS.   TV: Mild-Mod TR.   PV: No PI.  IAS: No PFO. NO R-> L shunt on color doppler and injection of agitated saline contrast.   There was mild, non-mobile atheroma seen in the thoracic aorta.     Patient successfully converted to sinus rhythm with synchronized  200 J of direct current cardioversion via AP pads.    DIAGNOSIS/IMPRESSION:  Atrial fibrillation with successful cardioversion to normal sinus rhythm. Moderate to severe mitral regurgitation.    PLAN OF CARE:  [x] Continue anticoagulation xarelto.  [x] Continue metoprolol succinate 100 mg po daily but discontinue cardizem CD upon discharge.  [ ] Catheterization:  [ ] Stress Test:  	  	  Home Medications:  metoprolol succinate 100 mg oral capsule, extended release: 1 cap(s) orally once a day (12 May 2021 09:35)  Xarelto 20 mg oral tablet: 1 tab(s) orally once a day (at bedtime) (12 May 2021 09:35)    MEDICATIONS  (STANDING):  chlorhexidine 4% Liquid 1 Application(s) Topical <User Schedule>  metoprolol tartrate 100 milliGRAM(s) Oral two times a day  rivaroxaban 15 milliGRAM(s) Oral with dinner    MEDICATIONS  (PRN):         Date of Admission: 05-14-21    CHIEF COMPLAINT: Patient is a 76y old  Female who presents with a chief complaint of Hypoxia (14 May 2021 02:32)      HPI:  76 years old female w/ Pmhx of HFrEF, Afib (on xarelto) s/p LEEANN/DCCV by Dr. Bergman 5/12 successfully yesterday came in with shortness of breath.  History of presenting illness goes back to one day ago when patient underwent LEEANN/DCCV for new onset atrial fibrillation. Pt was feeling fine after the procedure until yesterday night when she developed sudden onset SOB around 8:30pm while sitting on couch watching TV. Reports associated wheezing and dry cough since yesterday. Pt was unable to bretah and hence came to ED. Pt denies n/v/chest pain, diaphoresis, orthopnea, fever, chills cough, abdomen or back pain or lower extremity swelling/     In the ED, /99, HR 85, temp 98.5F, RR 20 saturating 86% on room air and now placed on 3L NC. Labs significant for WBC 13.33. CXR shows right basilar opacity. Pt received lasix in ED. To be admitted under medicine for further workup.   (14 May 2021 02:32)      PAST MEDICAL & SURGICAL HISTORY:  Afib    H/O varicose vein stripping    Lumbar herniated disc        FAMILY HISTORY:  FH: prostate cancer (Father)  father    [x] no pertinent family history of premature cardiovascular disease in first degree relatives.    SOCIAL HISTORY:    [x] Ex-Smoker  [x] No alcohol use  [x] No illicit drug use    Allergies: No Known Allergies    REVIEW OF SYSTEMS:  CONSTITUTIONAL: No fever, weight loss, or fatigue  CARDIOLOGY: No chest pain, dyspnea of exertion, or syncopal episodes.   RESPIRATORY:  (+) Shortness of breath. No cough, wheezing.   NEUROLOGICAL: No weakness, no focal deficits to report.  GI: No BRBPR, no N,V,diarrhea.    PSYCHIATRY: Normal mood and affect.  HEENT: No nasal discharge, no ecchymosis  SKIN: No ecchymosis, no breakdown  MUSCULOSKELETAL: Full range of motion x4.   EXTREM: No leg swelling or erythema.    PHYSICAL EXAM:  T(C): 36.9 (05-13-21 @ 23:05), Max: 36.9 (05-13-21 @ 23:05)  HR: 62 (05-14-21 @ 02:11) (62 - 85)  BP: 121/57 (05-14-21 @ 02:11) (121/57 - 196/99)  RR: 18 (05-14-21 @ 01:00) (18 - 20)  SpO2: 96% (05-14-21 @ 02:11) (86% - 98%)  Wt(kg): --  I&O's Summary    13 May 2021 07:01  -  14 May 2021 06:28  --------------------------------------------------------  IN: 0 mL / OUT: 550 mL / NET: -550 mL        General Appearance: NAD, normal for age and gender. 	  Neck: Normal JVP, no bruit.   Eyes: No xanthomalasia, Extra Ocular muscles intact.   Cardiovascular: Irregularly irregular, tachycardic, S1 S2, No JVD, Systolic murmur.  Respiratory: Right sided crackles. Decreased breath sounds overall.  Psychiatry: Alert and oriented x 3, Mood & affect appropriate  Gastrointestinal:  Soft, Non-tender, Non-distended.  Skin/Integumen: No rashes, No ecchymoses, No cyanosis	  Neurologic: Non-focal deficits.  Musculoskeletal/ extremities: Normal range of motion, No clubbing, cyanosis or edema  Vascular: Peripheral pulses palpable bilaterally    LABS:	 	                        14.7   13.33 )-----------( 292      ( 14 May 2021 00:00 )             44.9     05-14    142  |  105  |  20  ----------------------------<  143<H>  4.3   |  23  |  1.1    Ca    9.5      14 May 2021 00:00    TPro  6.5  /  Alb  4.3  /  TBili  0.4  /  DBili  x   /  AST  18  /  ALT  12  /  AlkPhos  93  05-14    CARDIAC MARKERS ( 14 May 2021 00:00 )  x     / <0.01 ng/mL / x     / x     / x        TELEMETRY EVENTS: 	    ECG:  < from: 12 Lead ECG (05.14.21 @ 00:48) >  Diagnosis Line Normal sinus rhythm  Rightward axis  Septal infarct , age undetermined  Abnormal ECG  	  RADIOLOGY:   OTHER: 	    PREVIOUS DIAGNOSTIC TESTING:    [x] Echocardiogram: 5/12  LEEANN    Preliminary Findings:  LA: Dilated.  ANASTASIA: Left atrial appendage was clear of clot and spontaneous echo contrast. Decreased velocities across ANASTASIA.  LV: LVEF was estimated at 30-35%.  RV: Normal size and systolic function.  MV: Moderate to Severe MR. No evidence for MS.   AV: Mild AI, no evidence for AS.   TV: Mild-Mod TR.   PV: No PI.  IAS: No PFO. NO R-> L shunt on color doppler and injection of agitated saline contrast.   There was mild, non-mobile atheroma seen in the thoracic aorta.     Patient successfully converted to sinus rhythm with synchronized  200 J of direct current cardioversion via AP pads.    DIAGNOSIS/IMPRESSION:  Atrial fibrillation with successful cardioversion to normal sinus rhythm. Moderate to severe mitral regurgitation.    PLAN OF CARE:  [x] Continue anticoagulation xarelto.  [x] Continue metoprolol succinate 100 mg po daily but discontinue cardizem CD upon discharge.  [ ] Catheterization:  [ ] Stress Test:  	  	  Home Medications:  metoprolol succinate 100 mg oral capsule, extended release: 1 cap(s) orally once a day (12 May 2021 09:35)  Xarelto 20 mg oral tablet: 1 tab(s) orally once a day (at bedtime) (12 May 2021 09:35)    MEDICATIONS  (STANDING):  chlorhexidine 4% Liquid 1 Application(s) Topical <User Schedule>  metoprolol tartrate 100 milliGRAM(s) Oral two times a day  rivaroxaban 15 milliGRAM(s) Oral with dinner    MEDICATIONS  (PRN):         Date of Admission: 05-14-21    CHIEF COMPLAINT: Patient is a 76y old  Female who presents with a chief complaint of Hypoxia (14 May 2021 02:32)        HPI:  76 years old female w/ Pmhx of HFrEF, Afib (on xarelto) s/p LEEANN/DCCV by Dr. Bergman 5/12 successfully yesterday came in with shortness of breath.  History of presenting illness goes back to one day ago when patient underwent LEEANN/DCCV for new onset atrial fibrillation. Pt was feeling fine after the procedure until yesterday night when she developed sudden onset SOB around 8:30pm while sitting on couch watching TV. Reports associated wheezing and dry cough since yesterday. Pt was unable to bretah and hence came to ED. Pt denies n/v/chest pain, diaphoresis, orthopnea, fever, chills cough, abdomen or back pain or lower extremity swelling/     In the ED, /99, HR 85, temp 98.5F, RR 20 saturating 86% on room air and now placed on 3L NC. Labs significant for WBC 13.33. CXR shows right basilar opacity. Pt received lasix in ED. To be admitted under medicine for further workup.   (14 May 2021 02:32)        PAST MEDICAL & SURGICAL HISTORY:  Afib    H/O varicose vein stripping    Lumbar herniated disc        FAMILY HISTORY:  FH: prostate cancer (Father)  father    [x] no pertinent family history of premature cardiovascular disease in first degree relatives.    SOCIAL HISTORY:    [x] Ex-Smoker  [x] No alcohol use  [x] No illicit drug use    Allergies: No Known Allergies    REVIEW OF SYSTEMS:  CONSTITUTIONAL: No fever, weight loss, or fatigue  CARDIOLOGY: No chest pain, dyspnea of exertion, or syncopal episodes.   RESPIRATORY:  (+) Shortness of breath. No cough, wheezing.   NEUROLOGICAL: No weakness, no focal deficits to report.  GI: No BRBPR, no N,V,diarrhea.    PSYCHIATRY: Normal mood and affect.  HEENT: No nasal discharge, no ecchymosis  SKIN: No ecchymosis, no breakdown  MUSCULOSKELETAL: Full range of motion x4.   EXTREM: No leg swelling or erythema.    PHYSICAL EXAM:  T(C): 36.9 (05-13-21 @ 23:05), Max: 36.9 (05-13-21 @ 23:05)  HR: 62 (05-14-21 @ 02:11) (62 - 85)  BP: 121/57 (05-14-21 @ 02:11) (121/57 - 196/99)  RR: 18 (05-14-21 @ 01:00) (18 - 20)  SpO2: 96% (05-14-21 @ 02:11) (86% - 98%)  Wt(kg): --  I&O's Summary    13 May 2021 07:01  -  14 May 2021 06:28  --------------------------------------------------------  IN: 0 mL / OUT: 550 mL / NET: -550 mL        General Appearance: NAD, normal for age and gender. 	  Neck: Normal JVP, no bruit.   Eyes: No xanthomalasia, Extra Ocular muscles intact.   Cardiovascular: Irregularly irregular, tachycardic, S1 S2, No JVD, Systolic murmur.  Respiratory: Right sided crackles. Decreased breath sounds overall.  Psychiatry: Alert and oriented x 3, Mood & affect appropriate  Gastrointestinal:  Soft, Non-tender, Non-distended.  Skin/Integumen: No rashes, No ecchymoses, No cyanosis	  Neurologic: Non-focal deficits.  Musculoskeletal/ extremities: Normal range of motion, No clubbing, cyanosis or edema  Vascular: Peripheral pulses palpable bilaterally    LABS:	 	                        14.7   13.33 )-----------( 292      ( 14 May 2021 00:00 )             44.9     05-14    142  |  105  |  20  ----------------------------<  143<H>  4.3   |  23  |  1.1    Ca    9.5      14 May 2021 00:00    TPro  6.5  /  Alb  4.3  /  TBili  0.4  /  DBili  x   /  AST  18  /  ALT  12  /  AlkPhos  93  05-14    CARDIAC MARKERS ( 14 May 2021 00:00 )  x     / <0.01 ng/mL / x     / x     / x        TELEMETRY EVENTS: 	    ECG:  < from: 12 Lead ECG (05.14.21 @ 00:48) >  Diagnosis Line Normal sinus rhythm  Rightward axis  Septal infarct , age undetermined  Abnormal ECG  	  RADIOLOGY:   OTHER: 	    PREVIOUS DIAGNOSTIC TESTING:    [x] Echocardiogram: 5/12  LEEANN    Preliminary Findings:  LA: Dilated.  ANASTASIA: Left atrial appendage was clear of clot and spontaneous echo contrast. Decreased velocities across ANASTASIA.  LV: LVEF was estimated at 30-35%.  RV: Normal size and systolic function.  MV: Moderate to Severe MR. No evidence for MS.   AV: Mild AI, no evidence for AS.   TV: Mild-Mod TR.   PV: No PI.  IAS: No PFO. NO R-> L shunt on color doppler and injection of agitated saline contrast.   There was mild, non-mobile atheroma seen in the thoracic aorta.     Patient successfully converted to sinus rhythm with synchronized  200 J of direct current cardioversion via AP pads.    DIAGNOSIS/IMPRESSION:  Atrial fibrillation with successful cardioversion to normal sinus rhythm. Moderate to severe mitral regurgitation.    PLAN OF CARE:  [x] Continue anticoagulation xarelto.  [x] Continue metoprolol succinate 100 mg po daily but discontinue cardizem CD upon discharge.  [ ] Catheterization:  [ ] Stress Test:  	  	  Home Medications:  metoprolol succinate 100 mg oral capsule, extended release: 1 cap(s) orally once a day (12 May 2021 09:35)  Xarelto 20 mg oral tablet: 1 tab(s) orally once a day (at bedtime) (12 May 2021 09:35)    MEDICATIONS  (STANDING):  chlorhexidine 4% Liquid 1 Application(s) Topical <User Schedule>  metoprolol tartrate 100 milliGRAM(s) Oral two times a day  rivaroxaban 15 milliGRAM(s) Oral with dinner    MEDICATIONS  (PRN):

## 2021-05-14 NOTE — ED PROVIDER NOTE - CLINICAL SUMMARY MEDICAL DECISION MAKING FREE TEXT BOX
Pt presented with SOB and wheezing s/p ablation for afib done yesterday. Required ekg, labs, monitor, O2. Cardiology consulted. Pt is in CHF. Lasix given and will be admitted for further management.

## 2021-05-14 NOTE — H&P ADULT - HISTORY OF PRESENT ILLNESS
76 years old female w/ Pmhx of HFrEF, Afib (on xarelto) s/p LEEANN/DCCV by Dr. Bergman 5/12 successfully yesterday came in with fatigue, sob and bilateral lower extremity swelling;   pt denies n/v/chest pain, diaphoresis, orthopnea, fever, chills cough, abdomen or back pain; pt cardiologist is Dr. Meier; pt utd with covid vaccination    In the ED, /99, HR 85, temp 98.5F, RR 20 saturating 86% on room air and now placed on 3L NC. Labs significant for WBC 13.33. CXR shows right basilar opacity. Pt received lasix in ED. To be admitted under medicine for further workup.   76 years old female w/ Pmhx of HFrEF, Afib (on xarelto) s/p LEEANN/DCCV by Dr. Bergman 5/12 successfully yesterday came in with shortness of breath.  History of presenting illness goes back to one day ago when patient underwent LEEANN/DCCV for new onset atrial fibrillation. Pt was feeling fine after the procedure until yesterday night when she developed sudden onset SOB around 8:30pm while sitting on couch watching TV. Reports associated wheezing and dry cough since yesterday. Pt was unable to bretah and hence came to ED. Pt denies n/v/chest pain, diaphoresis, orthopnea, fever, chills cough, abdomen or back pain or lower extremity swelling/     In the ED, /99, HR 85, temp 98.5F, RR 20 saturating 86% on room air and now placed on 3L NC. Labs significant for WBC 13.33. CXR shows right basilar opacity. Pt received lasix in ED. To be admitted under medicine for further workup.

## 2021-05-14 NOTE — ED ADULT NURSE NOTE - OBJECTIVE STATEMENT
Patient presents to ED c/o shortness of breath and wheezing x 1 day. Patient states that she underwent an ablation yesterday and has been SOB since.

## 2021-05-14 NOTE — H&P ADULT - ATTENDING COMMENTS
Patient seen and evaluated. Chart reviewed. CC: SOB    HPI:  75 yo F pt w/ a relevant hx of HFrEF and A-fib (s/p LEEANN-DCCV on 05/12/2021) p/w SOB and generalized weakness. Onset: 9 PM on the day before presentation. Precipitating factors: cannot think of any. Pain: denies. Intensity of symptoms: moderate to severe. Associated w/ severe generalized weakness and an episode of wheezing and dry coughing. Alleviating factors: none. Aggravating factors: none. No other concerns/complaints.    ROS:  Constitutional: no fevers; no chills; +generalized weakness  Eyes: no conjunctivitis; no itching  ENT: no dysphagia; no odynophagia  CVS: no PND; no orthopnea; no chest pain  Resp: +SOB; +dry coughing  GI: no nausea; no vomiting; no diarrhea; no abd pain  : no dysuria; no hematuria  MSK: no myalgias; no arthralgias  Skin: no rashes; no ulcers  Neuro: no focal weakness; no headache  All other systems reviewed and are negative    PMHx, home medications, SurHx, FHx and Social history as above in the corresponding sections of the note - reviewed and edited where appropriate    Exam:  Vitals: BP = 129/65; P = 98; T = 98.5; RR = 18; SpO2 > 95 on room air  General: appears stated age; cooperative  Eyes: anicteric sclera; moist conjunctiva; PERRL; EOMI  HENT: NC/AT; clear oropharynx w/ moist mucous membranes; nL hard/soft palate  Neck: supple w/ FROM; trachea midline  Lungs: no tachypnea, accessory muscle use, wheezing, rhonci; +mild basilar rales  CVS: +irregular rhythm; +tachy to 150s at the time of this eval; S1 and S2 w/ systolic murmur at apex; no rubs; no gallops  Abd: BS+; soft; non-tender to palpation x 4; no masses or HSM  Ext: no peripheral edema; pulses palpable distally  Skin: normal temp, turgor and texture; no rashes, ulcers or nodules  Neuro: CN II-XII intact; str 5/5 throughout  Psych: appropriate affect; alert and oriented to person, place, time and situation    Labs reviewed:   --- WBC 13.33  ---  , BUN/Cr 20/1.1, proBNP 1,297  --- pCO2 56, pH 7.37    Imaging reviewed: b/l opacities/effusions on CXR  EKG reviewed: NSR on admission EKG; A-fib w/ RVR on tele at the time of my eval    Assessment:  (1) Dyspnea & generalized weakness: unclear etiology  --- Could have been 2/2 intermittent A-fib RVR & her underlying CHF  --- Possible aspiration PNA - however, I have lower suspicion for this  --- Mild leukocytosis, no fever, no resp distress at rest, minimal cough  --- Plus CXR findings are chronic (w/ possible mild interval variation)  (2) Chronic A-fib w/ RVR  (3) Chronic HFrEF  (4) Mitral valve regurg - severe    Plan:  (1) Rate control: metoprolol tartrate 100 mg [id number] (goal rate < 110 bpm)  --- Was considering avoiding diltiazem given LVEF (will defer to Cardio)  (2) Anti-coagulant as dosed  (3) Check procal  (4) Will start amp-sulb   --- Consider de-escalating based on w/u and clinical picture  (5) Monitor volume status closely  (6) TTE pending  (7) Rest of meds as dosed - reviewed & edited where appropriate  (8) Supportive measures  (9) Dispo: anticipate a need for 48 - possibly 72 hrs of in-pt care    Full code

## 2021-05-14 NOTE — H&P ADULT - ASSESSMENT
76 years old female w/ Pmhx of HFrEF, Afib (on xarelto) s/p LEEANN/DCCV by Dr. Bergman 5/12 successfully yesterday came in with fatigue, sob and bilateral lower extremity swelling;     Impression:   Acute hypoxemic respiratory failure  RLL opacity - Possible aspiration PNA  Acute decompensated HFrEF  Paroxysmal Afibb on xarelto s/p LEEANN/DCCV 5/12  HO HFrEF (LVEF 35%)  - LEEANN 5/25 shows LVEF 35%, severe MR    Plan:  - c/w BB, xarelto  - repeat TTE  - check TSH, procalcitonin  - would hold as no fever or cough yet, so signs of PNA  - lasix  - strict I and Os, daily wt, restrict fluid  - f/u cardiology    # Diet: DASH/TLC  # DVT Prophylaxis: xarelto  # GI Prophylaxis::  # Activity: IAT  # IV Fluids:  # Dispo: Acute  # Code Status Fullcode:  # CHG wash        76 years old female w/ Pmhx of HFrEF, Afib (on xarelto) s/p LEEANN/DCCV by Dr. Bergman 5/12 successfully yesterday came in with shortness of breath.    Impression:   Acute hypoxemic respiratory failure  RLL opacity - Possible aspiration PNA vs pneumonitis   Paroxysmal Afibb on xarelto s/p LEEANN/DCCV 5/12  HO HFrEF (LVEF 35%)  - LEEANN 5/25 shows LVEF 35%, severe MR    Plan:  - c/w BB, xarelto  - repeat TTE, check EKG,   - telemetry shows NSR w/ PVCs,   - check procalcitonin  - start Unasyn 3g IV q6  - strict I and Os, daily wt, restrict fluid  - f/u cardiology    # Diet: DASH/TLC  # DVT Prophylaxis: xarelto  # GI Prophylaxis::  # Activity: IAT  # IV Fluids:  # Dispo: Acute  # Code Status Fullcode:  # CHG wash        76 years old female w/ Pmhx of HFrEF, Afib (on xarelto) s/p LEEANN/DCCV by Dr. Bergman 5/12 successfully yesterday came in with shortness of breath.    Impression:   Acute hypoxemic respiratory failure  RLL opacity - Possible aspiration PNA vs pneumonitis   Paroxysmal Afibb on xarelto s/p LEEANN/DCCV 5/12  HO HFrEF (LVEF 35%)  - LEEANN 5/12 shows LVEF 35%, severe MR  - euvolemic on exam    Plan:  - c/w BB, xarelto  - repeat TTE, check EKG,   - telemetry shows NSR w/ PVCs,   - check procalcitonin  - start Unasyn 3g IV q6  - strict I and Os, daily wt, restrict fluid  - f/u cardiology    # Diet: DASH/TLC  # DVT Prophylaxis: xarelto  # GI Prophylaxis: protonix  # Activity: IAT  # Dispo: Acute  # Code Status Fullcode:

## 2021-05-14 NOTE — CONSULT NOTE ADULT - ASSESSMENT
Assessment:    Afib on xarelto s/p recent cardioversion  Severe MR  HFrEF    Plan:  c/w antibiotic treatment for possible aspiration pneumonitis Assessment:  Afib with RVR on xarelto s/p recent cardioversion, went back to afib around 5/14 4:33 AM  Aspiration Pneumonia  Severe MR  HFrEF  Clinically dry    Plan:  c/w antibiotic treatment for aspiration pneumonia, recommend IV zosyn Assessment:  Afib with RVR on xarelto s/p recent cardioversion, went back to afib around 5/14 4:33 AM  Aspiration Pneumonia  Severe MR  HFrEF  Clinically euvolemic/dry    Plan:  c/w anticoagulation for Afib, increased hospital xarelto dose to 20 mg po q24h  c/w beta-blocker, restart cardizem (previously taking 120 mg CD)  c/w antibiotic treatment for aspiration pneumonia, recommend IV zosyn

## 2021-05-14 NOTE — ED ADULT NURSE NOTE - CHIEF COMPLAINT QUOTE
Patient presents to ED c/o shortness of breath and wheezing x 1 day. Patient states that she underwent transcardio TTE procedure yesterday and hasn't been feeling well since.

## 2021-05-14 NOTE — CONSULT NOTE ADULT - SUBJECTIVE AND OBJECTIVE BOX
Patient is a 76y old  Female who presents with a chief complaint of Hypoxia (14 May 2021 06:27)      HPI:  76 years old female w/ Pmhx of HFrEF, Afib (on xarelto) s/p LEEANN/DCCV by Dr. Bergman 5/12 successfully yesterday came in with shortness of breath.  History of presenting illness goes back to one day ago when patient underwent LEEANN/DCCV for new onset atrial fibrillation. Pt was feeling fine after the procedure until yesterday night when she developed sudden onset SOB around 8:30pm while sitting on couch watching TV. Reports associated wheezing and dry cough since yesterday. Pt was unable to bretah and hence came to ED. Pt denies n/v/chest pain, diaphoresis, orthopnea, fever, chills cough, abdomen or back pain or lower extremity swelling/     In the ED, /99, HR 85, temp 98.5F, RR 20 saturating 86% on room air and now placed on 3L NC. Labs significant for WBC 13.33. CXR shows right basilar opacity. Pt received lasix in ED. To be admitted under medicine for further workup.   (14 May 2021 02:32)      PAST MEDICAL & SURGICAL HISTORY:  Afib    H/O varicose vein stripping    Lumbar herniated disc        SOCIAL HX:   Smoking: Non Smoker                         ETOH: Denies use                           Other: Denies Recreational Drug use    FAMILY HISTORY:  FH: prostate cancer (Father)  father    .  No cardiovascular or pulmonary family history     REVIEW OF SYSTEMS:    All ROS are negative exept per HPI       Allergies    No Known Allergies    Intolerances          PHYSICAL EXAM  Vital Signs Last 24 Hrs  T(C): 36.9 (13 May 2021 23:05), Max: 36.9 (13 May 2021 23:05)  T(F): 98.5 (13 May 2021 23:05), Max: 98.5 (13 May 2021 23:05)  HR: 98 (14 May 2021 06:35) (62 - 98)  BP: 129/65 (14 May 2021 06:35) (121/57 - 196/99)  RR: 18 (14 May 2021 01:00) (18 - 20)  SpO2: 97% (14 May 2021 06:35) (86% - 98%)    CONSTITUTIONAL:  Well nourished.  NAD    ENT:   Airway patent,   No thrush    EYES:   Clear bilaterally,   pupils equal,   round and reactive to light.    CARDIAC:   Normal rate,   regular rhythm.    no edema      RESPIRATORY:   No wheezing   Normal chest expansion  Not tachypneic,  No use of accessory muscles    GASTROINTESTINAL:  Abdomen soft, non-tender,   No guarding,   Positive BS    MUSCULOSKELETAL:   Range of motion is not limited,  No clubbing, cyanosis    NEUROLOGICAL:   Alert and oriented   No motor deficits.    SKIN:   Skin normal color for race,   No evidence of rash.      HEME LYMPH:   No cervical  lymphadenopathy.  no inguinal lymphadenopathy          LABS:                          14.7   13.33 )-----------( 292      ( 14 May 2021 00:00 )             44.9                                               05-14    142  |  105  |  20  ----------------------------<  143<H>  4.3   |  23  |  1.1    Ca    9.5      14 May 2021 00:00    TPro  6.5  /  Alb  4.3  /  TBili  0.4  /  DBili  x   /  AST  18  /  ALT  12  /  AlkPhos  93  05-14                                                 CARDIAC MARKERS ( 14 May 2021 00:00 )  x     / <0.01 ng/mL / x     / x     / x                                                LIVER FUNCTIONS - ( 14 May 2021 00:00 )  Alb: 4.3 g/dL / Pro: 6.5 g/dL / ALK PHOS: 93 U/L / ALT: 12 U/L / AST: 18 U/L / GGT: x                                                                                                MEDICATIONS  (STANDING):  ampicillin/sulbactam  IVPB 3 Gram(s) IV Intermittent every 6 hours  chlorhexidine 4% Liquid 1 Application(s) Topical <User Schedule>  diltiazem    milliGRAM(s) Oral daily  metoprolol tartrate 100 milliGRAM(s) Oral two times a day  rivaroxaban 20 milliGRAM(s) Oral with dinner    MEDICATIONS  (PRN):      < from: [facility] Duplex Lower Ext Vein Scan, Bilat (11.23.20 @ 23:19) >  Impression: No evidence of deep venous thrombosis or superficial thrombophlebitis in the bilateral lower extremities.  < end of copied text >      < from: Xray Chest 1 View-PORTABLE IMMEDIATE (11.23.20 @ 14:41) >  IMPRESSION: Bilateral opacities/effusions.  < end of copied text >   Patient is a 76y old  Female who presents with a chief complaint of Hypoxia (14 May 2021 06:27)      HPI:  76 years old female w/ Pmhx of HFrEF, Afib (on xarelto) s/p LEEANN/DCCV by Dr. Bergman 5/12 successfully yesterday came in with shortness of breath.  History of presenting illness goes back to one day ago when patient underwent LEEANN/DCCV for new onset atrial fibrillation. Pt was feeling fine after the procedure until yesterday night when she developed sudden onset SOB around 8:30pm while sitting on couch watching TV. Reports associated wheezing and dry cough since yesterday. Pt was unable to bretah and hence came to ED. Pt denies n/v/chest pain, diaphoresis, orthopnea, fever, chills cough, abdomen or back pain or lower extremity swelling/     In the ED, /99, HR 85, temp 98.5F, RR 20 saturating 86% on room air and now placed on 3L NC. Labs significant for WBC 13.33. CXR shows right basilar opacity. Pt received lasix in ED. To be admitted under medicine for further workup.   (14 May 2021 02:32)      PAST MEDICAL & SURGICAL HISTORY:  Afib    H/O varicose vein stripping    Lumbar herniated disc        SOCIAL HX:   Smoking: Former smoker ( 1.2 ppd for ~50 years, quit at age 70)              ETOH: Drinks wine occasionally                     Other: Denies Recreational Drug use    FAMILY HISTORY:  FH: prostate cancer (Father)  father    .  No cardiovascular or pulmonary family history     REVIEW OF SYSTEMS:    All ROS are negative exept per HPI       Allergies    No Known Allergies    Intolerances          PHYSICAL EXAM  Vital Signs Last 24 Hrs  T(C): 36.9 (13 May 2021 23:05), Max: 36.9 (13 May 2021 23:05)  T(F): 98.5 (13 May 2021 23:05), Max: 98.5 (13 May 2021 23:05)  HR: 98 (14 May 2021 06:35) (62 - 98)  BP: 129/65 (14 May 2021 06:35) (121/57 - 196/99)  RR: 18 (14 May 2021 01:00) (18 - 20)  SpO2: 97% (14 May 2021 06:35) (86% - 98%)    CONSTITUTIONAL:  Well nourished.  NAD    ENT:   Airway patent,  No thrush    EYES:   Clear bilaterally,   pupils equal,   round and reactive to light.    CARDIAC:   Normal rate,   regular rhythm.    no edema      RESPIRATORY:   No wheezing   Normal chest expansion  Not tachypneic,  No use of accessory muscles  Crackling at b/l bases    GASTROINTESTINAL:  Abdomen soft, non-tender,   No guarding,   Positive BS    MUSCULOSKELETAL:   Range of motion is not limited,  No clubbing, cyanosis    NEUROLOGICAL:   Alert and oriented   No motor deficits.    SKIN:   Skin normal color for race,   No evidence of rash.      HEME LYMPH:   No cervical  lymphadenopathy.  no inguinal lymphadenopathy      LABS:                          14.7   13.33 )-----------( 292      ( 14 May 2021 00:00 )             44.9                                               05-14    142  |  105  |  20  ----------------------------<  143<H>  4.3   |  23  |  1.1    Ca    9.5      14 May 2021 00:00    TPro  6.5  /  Alb  4.3  /  TBili  0.4  /  DBili  x   /  AST  18  /  ALT  12  /  AlkPhos  93  05-14                                                 CARDIAC MARKERS ( 14 May 2021 00:00 )  x     / <0.01 ng/mL / x     / x     / x                                                LIVER FUNCTIONS - ( 14 May 2021 00:00 )  Alb: 4.3 g/dL / Pro: 6.5 g/dL / ALK PHOS: 93 U/L / ALT: 12 U/L / AST: 18 U/L / GGT: x                                                                                                MEDICATIONS  (STANDING):  ampicillin/sulbactam  IVPB 3 Gram(s) IV Intermittent every 6 hours  chlorhexidine 4% Liquid 1 Application(s) Topical <User Schedule>  diltiazem    milliGRAM(s) Oral daily  metoprolol tartrate 100 milliGRAM(s) Oral two times a day  rivaroxaban 20 milliGRAM(s) Oral with dinner    MEDICATIONS  (PRN):      < from: [facility] Duplex Lower Ext Vein Scan, Bilat (11.23.20 @ 23:19) >  Impression: No evidence of deep venous thrombosis or superficial thrombophlebitis in the bilateral lower extremities.  < end of copied text >      < from: Xray Chest 1 View-PORTABLE IMMEDIATE (11.23.20 @ 14:41) >  IMPRESSION: Bilateral opacities/effusions.  < end of copied text >   Patient is a 76y old  Female who presents with a chief complaint of Hypoxia (14 May 2021 06:27)      HPI:  76 years old female w/ Pmhx of HFrEF, Afib (on xarelto) s/p LEEANN/DCCV by Dr. Bergman 5/12 successfully yesterday came in with shortness of breath.  History of presenting illness goes back to one day ago when patient underwent LEEANN/DCCV for new onset atrial fibrillation. Pt was feeling fine after the procedure until yesterday night when she developed sudden onset SOB around 8:30pm while sitting on couch watching TV. Reports associated wheezing and dry cough since yesterday. Pt was unable to bretah and hence came to ED. Pt denies n/v/chest pain, diaphoresis, orthopnea, fever, chills cough, abdomen or back pain or lower extremity swelling.    In the ED, /99, HR 85, temp 98.5F, RR 20 saturating 86% on room air and now placed on 3L NC. Labs significant for WBC 13.33. CXR shows right basilar opacity. Pt received lasix in ED. To be admitted under medicine for further workup.   (14 May 2021 02:32)      PAST MEDICAL & SURGICAL HISTORY:  Afib    H/O varicose vein stripping    Lumbar herniated disc        SOCIAL HX:   Smoking: Former smoker ( 1.2 ppd for ~50 years, quit at age 70)              ETOH: Drinks wine occasionally                     Other: Denies Recreational Drug use    FAMILY HISTORY:  FH: prostate cancer (Father)  father    .  No cardiovascular or pulmonary family history     REVIEW OF SYSTEMS:    All ROS are negative exept per HPI       Allergies    No Known Allergies    Intolerances      PHYSICAL EXAM  Vital Signs Last 24 Hrs  T(C): 36.9 (13 May 2021 23:05), Max: 36.9 (13 May 2021 23:05)  T(F): 98.5 (13 May 2021 23:05), Max: 98.5 (13 May 2021 23:05)  HR: 98 (14 May 2021 06:35) (62 - 98)  BP: 129/65 (14 May 2021 06:35) (121/57 - 196/99)  RR: 18 (14 May 2021 01:00) (18 - 20)  SpO2: 97% (14 May 2021 06:35) (86% - 98%)    CONSTITUTIONAL:  Well nourished.  NAD    ENT:   Airway patent,  No thrush    EYES:   Clear bilaterally,   pupils equal,   round and reactive to light.    CARDIAC:   Tachy  Irregular rhythm.    no edema    RESPIRATORY:   Crackles at b/l bases  No wheezing   Normal chest expansion  Not tachypneic,  No use of accessory muscles    GASTROINTESTINAL:  Abdomen soft, non-tender,   No guarding,   Positive BS    MUSCULOSKELETAL:   Range of motion is not limited,  No clubbing, cyanosis    NEUROLOGICAL:   AOx4  Follows commands  Moves all extremities   Alert and oriented   No motor deficits.    SKIN:   Skin normal color for race,   No evidence of rash.      LABS:                          14.7   13.33 )-----------( 292      ( 14 May 2021 00:00 )             44.9                                               05-14    142  |  105  |  20  ----------------------------<  143<H>  4.3   |  23  |  1.1    Ca    9.5      14 May 2021 00:00    TPro  6.5  /  Alb  4.3  /  TBili  0.4  /  DBili  x   /  AST  18  /  ALT  12  /  AlkPhos  93  05-14                                                 CARDIAC MARKERS ( 14 May 2021 00:00 )  x     / <0.01 ng/mL / x     / x     / x                                                LIVER FUNCTIONS - ( 14 May 2021 00:00 )  Alb: 4.3 g/dL / Pro: 6.5 g/dL / ALK PHOS: 93 U/L / ALT: 12 U/L / AST: 18 U/L / GGT: x                                                                                                MEDICATIONS  (STANDING):  ampicillin/sulbactam  IVPB 3 Gram(s) IV Intermittent every 6 hours  chlorhexidine 4% Liquid 1 Application(s) Topical <User Schedule>  diltiazem    milliGRAM(s) Oral daily  metoprolol tartrate 100 milliGRAM(s) Oral two times a day  rivaroxaban 20 milliGRAM(s) Oral with dinner    MEDICATIONS  (PRN):      < from: [facility] Duplex Lower Ext Vein Scan, Bilat (11.23.20 @ 23:19) >  Impression: No evidence of deep venous thrombosis or superficial thrombophlebitis in the bilateral lower extremities.  < end of copied text >      < from: Xray Chest 1 View-PORTABLE IMMEDIATE (11.23.20 @ 14:41) >  IMPRESSION: Bilateral opacities/effusions.  < end of copied text >

## 2021-05-14 NOTE — ED PROVIDER NOTE - OBJECTIVE STATEMENT
76 yold female to ED Pmhx Afib currently on xarelto 20mg qd, metoprolol 100mg bid, (diltiazem d/c yest); pt s/p transesophageal cardioversion by Dr. Bergman 5/12 successfully; yesterday developed fatigue, sob and lower ext ext swelling bilat; pt denies n/v/chest pain, diaphoresis, orthopnea, fever, chills cough, abdomen or back pain; pt cardiologist is Dr. Meier; pt utd with covid vaccination;

## 2021-05-14 NOTE — ED PROVIDER NOTE - PROGRESS NOTE DETAILS
case d/w DR. Talley; pt given lasix, comfortable with 4l nc; sat 90%; pt not laboring; will admit to tele for diuresis;

## 2021-05-14 NOTE — CONSULT NOTE ADULT - ASSESSMENT
ASSESSMENT:    PNA likely Aspiration  HFrEF  Aib    PLAN:    Unasyn  Blood and Sputum cx, Nasal MRSA  Procal  Echo  HOB 45 degree  Aspiration precaution  DVT ppx   ASSESSMENT:    PNA likely Aspiration  HFrEF  Aib  Former smoker ( 1.2 ppd for ~50 years, quit at age 70)     PLAN:    Unasyn  Blood and Sputum cx, Nasal MRSA, Procal, TSH  Echo  PFT and low dose CT outpatient  HOB 45 degree  Aspiration precaution  DVT ppx   ASSESSMENT:  Possible aspiration  HO HFrEF (5/12-EF 30-35%, sev MR, mod TR)  Afib w/ RVR  Former smoker (1.2 ppd for ~50 years, quit at age 70)     PLAN:  Unasyn  Blood and Sputum cx  Nasal MRSA  Procal  TSH, T4  ECHO  Rate control as per cardio  OP pulm FU for PFT and lung cancer screening  HOB 45 degree  Aspiration precaution  On xarelto for Afib

## 2021-05-14 NOTE — H&P ADULT - NSHPLABSRESULTS_GEN_ALL_CORE
(05-14 @ 00:00)                      14.7  13.33 )-----------( 292                 44.9    Neutrophils = 10.76 (80.7%)  Lymphocytes = 1.28 (9.6%)  Eosinophils = 0.17 (1.3%)  Basophils = 0.11 (0.8%)  Monocytes = 0.91 (6.8%)  Bands = --%    05-14    142  |  105  |  20  ----------------------------<  143<H>  4.3   |  23  |  1.1    Ca    9.5      14 May 2021 00:00    TPro  6.5  /  Alb  4.3  /  TBili  0.4  /  DBili  x   /  AST  18  /  ALT  12  /  AlkPhos  93  05-14      CARDIAC MARKERS ( 14 May 2021 00:00 )  Trop <0.01 ng/mL / CK x     / CKMB x

## 2021-05-14 NOTE — ED PROVIDER NOTE - ATTENDING CONTRIBUTION TO CARE
I personally evaluated the patient. I reviewed the Resident’s or Physician Assistant’s note (as assigned above), and agree with the findings and plan except as documented in my note.  76 F with hx of afib s/p ablation yesterday 5/12/21 by Dr. Bergman presents with acute onset SOB and associated wheezing. Denies CP, lightheadedness, LOC. No LE swelling. VS reviewed, pt non-toxic appearing, NAD, talking in full sentences. Head ncat, MMM, neck supple, no JVD, normal ROM, normal s1s2 without any murmurs, Lungs with slighlty increased, work of breathing, no wheezing, crackles or rales, abd +BS, s/nd/nt, extremities w/o edema, or cyanosis, neuro exam grossly normal. No acute skin rashes. Plan is ekg, labs, monitor, O2, meds, images as needed and doispo accordingly.

## 2021-05-14 NOTE — ED PROVIDER NOTE - PHYSICAL EXAMINATION
Constitutional: Well developed, well nourished. NAD  Head: Normocephalic, atraumatic.  Eyes: PERRL, EOMI.  ENT: No nasal discharge. Mucous membranes dry.  Neck: Supple. Painless ROM.  Cardiovascular:  Regular rate and rhythm.    Pulmonary:  bilat basilar rales;   Abdominal: Soft. Nondistended. No rebound, guarding, rigidity.  Extremities. Pelvis stable. No lower extremity edema, symmetric calves;  Skin: No rashes, cyanosis.  Neuro: AAOx3. No focal neurological deficits.  Psych: Normal mood. Normal affect.

## 2021-05-14 NOTE — ED ADULT NURSE NOTE - NSIMPLEMENTINTERV_GEN_ALL_ED
Implemented All Universal Safety Interventions:  Stamps to call system. Call bell, personal items and telephone within reach. Instruct patient to call for assistance. Room bathroom lighting operational. Non-slip footwear when patient is off stretcher. Physically safe environment: no spills, clutter or unnecessary equipment. Stretcher in lowest position, wheels locked, appropriate side rails in place.

## 2021-05-14 NOTE — ED PROVIDER NOTE - CARE PLAN
Principal Discharge DX:	CHF (congestive heart failure)  Secondary Diagnosis:	Dyspnea   Principal Discharge DX:	CHF (congestive heart failure)  Secondary Diagnosis:	Dyspnea  Secondary Diagnosis:	Hypoxia

## 2021-05-14 NOTE — H&P ADULT - NSHPPHYSICALEXAM_GEN_ALL_CORE
PHYSICAL EXAM:  GENERAL: NAD, lying in bed comfortably  HEAD:  Atraumatic, Normocephalic  EYES: EOMI, PERRLA, conjunctiva and sclera clear  ENT: Moist mucous membranes  NECK: Supple, No JVD  CHEST/LUNG: Clear to auscultation bilaterally; No rales, rhonchi, wheezing, or rubs. Unlabored respirations  HEART: Regular rate and rhythm; No murmurs, rubs, or gallops  ABDOMEN: Bowel sounds present; Soft, Nontender, Nondistended. No hepatomegally  EXTREMITIES:  2+ Peripheral Pulses, brisk capillary refill. No clubbing, cyanosis, or edema  NERVOUS SYSTEM:  Alert & Oriented X3, speech clear. No deficits   MSK: FROM all 4 extremities, full and equal strength  SKIN: No rashes or lesions PHYSICAL EXAM:  GENERAL: NAD, lying in bed comfortably  HEAD:  Atraumatic, Normocephalic  EYES: EOMI, PERRLA, conjunctiva and sclera clear  ENT: Moist mucous membranes  NECK: Supple, No JVD  CHEST/LUNG: decreased bs rll  HEART: Regular rate and rhythm; No murmurs, rubs, or gallops  ABDOMEN: Bowel sounds present; Soft, Nontender, Nondistended. No hepatomegally  EXTREMITIES:  2+ Peripheral Pulses, brisk capillary refill. No clubbing, cyanosis, or edema  NERVOUS SYSTEM:  Alert & Oriented X3, speech clear. No deficits   MSK: FROM all 4 extremities, full and equal strength  SKIN: No rashes or lesions

## 2021-05-15 LAB
A1C WITH ESTIMATED AVERAGE GLUCOSE RESULT: 5.4 % — SIGNIFICANT CHANGE UP (ref 4–5.6)
ALBUMIN SERPL ELPH-MCNC: 3.7 G/DL — SIGNIFICANT CHANGE UP (ref 3.5–5.2)
ALP SERPL-CCNC: 87 U/L — SIGNIFICANT CHANGE UP (ref 30–115)
ALT FLD-CCNC: 9 U/L — SIGNIFICANT CHANGE UP (ref 0–41)
ANION GAP SERPL CALC-SCNC: 13 MMOL/L — SIGNIFICANT CHANGE UP (ref 7–14)
APTT BLD: 42.6 SEC — HIGH (ref 27–39.2)
AST SERPL-CCNC: 11 U/L — SIGNIFICANT CHANGE UP (ref 0–41)
BASOPHILS # BLD AUTO: 0.08 K/UL — SIGNIFICANT CHANGE UP (ref 0–0.2)
BASOPHILS NFR BLD AUTO: 0.6 % — SIGNIFICANT CHANGE UP (ref 0–1)
BILIRUB SERPL-MCNC: 1 MG/DL — SIGNIFICANT CHANGE UP (ref 0.2–1.2)
BUN SERPL-MCNC: 16 MG/DL — SIGNIFICANT CHANGE UP (ref 10–20)
CALCIUM SERPL-MCNC: 9.2 MG/DL — SIGNIFICANT CHANGE UP (ref 8.5–10.1)
CHLORIDE SERPL-SCNC: 99 MMOL/L — SIGNIFICANT CHANGE UP (ref 98–110)
CO2 SERPL-SCNC: 27 MMOL/L — SIGNIFICANT CHANGE UP (ref 17–32)
COVID-19 SPIKE DOMAIN AB INTERP: POSITIVE
COVID-19 SPIKE DOMAIN ANTIBODY RESULT: >250 U/ML — HIGH
CREAT SERPL-MCNC: 0.9 MG/DL — SIGNIFICANT CHANGE UP (ref 0.7–1.5)
CRP SERPL-MCNC: 253 MG/L — HIGH
EOSINOPHIL # BLD AUTO: 0.18 K/UL — SIGNIFICANT CHANGE UP (ref 0–0.7)
EOSINOPHIL NFR BLD AUTO: 1.4 % — SIGNIFICANT CHANGE UP (ref 0–8)
ESTIMATED AVERAGE GLUCOSE: 108 MG/DL — SIGNIFICANT CHANGE UP (ref 68–114)
FOLATE SERPL-MCNC: 6.3 NG/ML — SIGNIFICANT CHANGE UP
GLUCOSE SERPL-MCNC: 123 MG/DL — HIGH (ref 70–99)
HCT VFR BLD CALC: 48.2 % — HIGH (ref 37–47)
HGB BLD-MCNC: 15.9 G/DL — SIGNIFICANT CHANGE UP (ref 12–16)
IMM GRANULOCYTES NFR BLD AUTO: 0.5 % — HIGH (ref 0.1–0.3)
INR BLD: 2.33 RATIO — HIGH (ref 0.65–1.3)
LYMPHOCYTES # BLD AUTO: 1.36 K/UL — SIGNIFICANT CHANGE UP (ref 1.2–3.4)
LYMPHOCYTES # BLD AUTO: 10.9 % — LOW (ref 20.5–51.1)
MAGNESIUM SERPL-MCNC: 2 MG/DL — SIGNIFICANT CHANGE UP (ref 1.8–2.4)
MCHC RBC-ENTMCNC: 27.7 PG — SIGNIFICANT CHANGE UP (ref 27–31)
MCHC RBC-ENTMCNC: 33 G/DL — SIGNIFICANT CHANGE UP (ref 32–37)
MCV RBC AUTO: 84.1 FL — SIGNIFICANT CHANGE UP (ref 81–99)
MONOCYTES # BLD AUTO: 0.81 K/UL — HIGH (ref 0.1–0.6)
MONOCYTES NFR BLD AUTO: 6.5 % — SIGNIFICANT CHANGE UP (ref 1.7–9.3)
NEUTROPHILS # BLD AUTO: 10.03 K/UL — HIGH (ref 1.4–6.5)
NEUTROPHILS NFR BLD AUTO: 80.1 % — HIGH (ref 42.2–75.2)
NRBC # BLD: 0 /100 WBCS — SIGNIFICANT CHANGE UP (ref 0–0)
PLATELET # BLD AUTO: 273 K/UL — SIGNIFICANT CHANGE UP (ref 130–400)
POTASSIUM SERPL-MCNC: 3.5 MMOL/L — SIGNIFICANT CHANGE UP (ref 3.5–5)
POTASSIUM SERPL-SCNC: 3.5 MMOL/L — SIGNIFICANT CHANGE UP (ref 3.5–5)
PROCALCITONIN SERPL-MCNC: 0.25 NG/ML — HIGH (ref 0.02–0.1)
PROT SERPL-MCNC: 6 G/DL — SIGNIFICANT CHANGE UP (ref 6–8)
PROTHROM AB SERPL-ACNC: 26.8 SEC — HIGH (ref 9.95–12.87)
RBC # BLD: 5.73 M/UL — HIGH (ref 4.2–5.4)
RBC # FLD: 12.9 % — SIGNIFICANT CHANGE UP (ref 11.5–14.5)
SARS-COV-2 IGG+IGM SERPL QL IA: >250 U/ML — HIGH
SARS-COV-2 IGG+IGM SERPL QL IA: POSITIVE
SODIUM SERPL-SCNC: 139 MMOL/L — SIGNIFICANT CHANGE UP (ref 135–146)
TROPONIN T SERPL-MCNC: <0.01 NG/ML — SIGNIFICANT CHANGE UP
WBC # BLD: 12.52 K/UL — HIGH (ref 4.8–10.8)
WBC # FLD AUTO: 12.52 K/UL — HIGH (ref 4.8–10.8)

## 2021-05-15 PROCEDURE — 71045 X-RAY EXAM CHEST 1 VIEW: CPT | Mod: 26

## 2021-05-15 PROCEDURE — 99223 1ST HOSP IP/OBS HIGH 75: CPT

## 2021-05-15 PROCEDURE — 99233 SBSQ HOSP IP/OBS HIGH 50: CPT

## 2021-05-15 RX ADMIN — Medication 100 MILLIGRAM(S): at 06:10

## 2021-05-15 RX ADMIN — Medication 100 MILLIGRAM(S): at 17:12

## 2021-05-15 RX ADMIN — CEFTRIAXONE 100 MILLIGRAM(S): 500 INJECTION, POWDER, FOR SOLUTION INTRAMUSCULAR; INTRAVENOUS at 06:10

## 2021-05-15 RX ADMIN — RIVAROXABAN 20 MILLIGRAM(S): KIT at 17:12

## 2021-05-15 NOTE — CONSULT NOTE ADULT - SUBJECTIVE AND OBJECTIVE BOX
Patient is a 76y old  Female who presents with a chief complaint of Hypoxia (15 May 2021 10:27)    HPI:  76 years old female w/ Pmhx of HFrEF, Afib (on xarelto) s/p LEEANN/DCCV by Dr. Bergman 5/12 successfully yesterday came in with shortness of breath.  History of presenting illness goes back to one day ago when patient underwent LEEANN/DCCV for new onset atrial fibrillation. Pt was feeling fine after the procedure until yesterday night when she developed sudden onset SOB around 8:30pm while sitting on couch watching TV. Reports associated wheezing and dry cough since yesterday. Pt was unable to breath and hence came to ED. Pt denies n/v/chest pain, diaphoresis, orthopnea, fever, chills cough, abdomen or back pain or lower extremity swelling/     In the ED, /99, HR 85, temp 98.5F, RR 20 saturating 86% on room air and now placed on 3L NC. Labs significant for WBC 13.33. CXR shows right basilar opacity. Pt received lasix in ED. To be admitted under medicine for further workup.   (14 May 2021 02:32)      EP consulted for AF management.     REVIEW OF SYSTEMS    [x] A ten-point review of systems was otherwise negative except as noted.  [ ] Due to altered mental status/intubation, subjective information were not able to be obtained from the patient. History was obtained, to the extent possible, from review of the chart and collateral sources of information.      PAST MEDICAL & SURGICAL HISTORY:  Afib    H/O varicose vein stripping    Lumbar herniated disc    Home Medications:  metoprolol succinate 100 mg oral capsule, extended release: 1 cap(s) orally once a day (12 May 2021 09:35)  Xarelto 20 mg oral tablet: 1 tab(s) orally once a day (at bedtime) (12 May 2021 09:35)      Allergies:  No Known Allergies      FAMILY HISTORY:  FH: prostate cancer (Father)  father    SOCIAL HISTORY: on smoker, non alcoholic, no illicit drug use    MEDICATIONS  (STANDING):  cefTRIAXone   IVPB 1000 milliGRAM(s) IV Intermittent every 24 hours  chlorhexidine 4% Liquid 1 Application(s) Topical <User Schedule>  metoprolol tartrate 100 milliGRAM(s) Oral two times a day  rivaroxaban 20 milliGRAM(s) Oral with dinner    MEDICATIONS  (PRN):    Vital Signs Last 24 Hrs  T(C): 36.6 (15 May 2021 14:06), Max: 36.9 (14 May 2021 15:59)  T(F): 97.8 (15 May 2021 14:06), Max: 98.5 (14 May 2021 15:59)  HR: 96 (15 May 2021 05:20) (86 - 98)  BP: 128/64 (15 May 2021 05:20) (103/64 - 154/80)  BP(mean): --  RR: 19 (15 May 2021 05:20) (18 - 20)  SpO2: 92% (15 May 2021 08:16) (92% - 97%)    PHYSICAL EXAM:    GENERAL: In no apparent distress, well nourished, and hydrated.  HEART: Irregular rate and rhythm; No murmurs, rubs, or gallops.  PULMONARY: Clear to auscultation and perfusion.  No rales, wheezing, or rhonchi bilaterally.  ABDOMEN: Soft, Nontender, Nondistended; Bowel sounds present  EXTREMITIES:  2+ Peripheral Pulses, No clubbing, cyanosis, or edema  NEUROLOGICAL: AO x4, MCCORMICK, speech clear.       INTERPRETATION OF TELEMETRY: AF 93 BPM    ECG: < from: 12 Lead ECG (05.14.21 @ 11:26) >  Ventricular Rate 111 BPM    Atrial Rate 375 BPM    QRS Duration 82 ms    Q-T Interval 348 ms    QTC Calculation(Bazett) 473 ms    R Axis 33 degrees    T Axis 27 degrees    Diagnosis Line Atrial fibrillation with rapid ventricular response  Nonspecific ST abnormality  Abnormal ECG    < end of copied text >      I&O's Detail    15 May 2021 07:01  -  15 May 2021 14:33  --------------------------------------------------------  IN:    Oral Fluid: 590 mL  Total IN: 590 mL    OUT:    Voided (mL): 600 mL  Total OUT: 600 mL    Total NET: -10 mL      LABS:                        15.9   12.52 )-----------( 273      ( 15 May 2021 09:04 )             48.2     05-15    139  |  99  |  16  ----------------------------<  123<H>  3.5   |  27  |  0.9    Ca    9.2      15 May 2021 09:04  Mg     2.0     05-15    TPro  6.0  /  Alb  3.7  /  TBili  1.0  /  DBili  x   /  AST  11  /  ALT  9   /  AlkPhos  87  05-15    CARDIAC MARKERS ( 15 May 2021 09:04 )  x     / <0.01 ng/mL / x     / x     / x      CARDIAC MARKERS ( 14 May 2021 00:00 )  x     / <0.01 ng/mL / x     / x     / x          PT/INR - ( 15 May 2021 09:04 )   PT: 26.80 sec;   INR: 2.33 ratio         PTT - ( 15 May 2021 09:04 )  PTT:42.6 sec      I&O's Detail    15 May 2021 07:01  -  15 May 2021 14:33  --------------------------------------------------------  IN:    Oral Fluid: 590 mL  Total IN: 590 mL    OUT:    Voided (mL): 600 mL  Total OUT: 600 mL    Total NET: -10 mL    Daily Height in cm: 167.64 (14 May 2021 20:22)    Daily     RADIOLOGY & ADDITIONAL STUDIES:  FINDINGS:    After risks and benefits of procedures were explained, informed consent was obtained and placed in chart. Refer to Anesthesia note for sedation details.  The LEEANN probe was passed into the esophagus without difficulty.  Transesophageal and transgastric images were obtained.  The LEEANN probe was removed without difficulty and examined.  There was no evidence for bleeding.  The patient tolerated the procedure well without any immediate LEEANN-related complications.      Preliminary Findings:  LA: Dilated.  ANASTASIA: Left atrial appendage was clear of clot and spontaneous echo contrast. Decreased velocities across ANASTASIA.  LV: LVEF was estimated at 30-35%.  RV: Normal size and systolic function.  MV: Moderate to Severe MR. No evidence for MS.   AV: Mild AI, no evidence for AS.   TV: Mild-Mod TR.   PV: No PI.  IAS: No PFO. NO R-> L shunt on color doppler and injection of agitated saline contrast.   There was mild, non-mobile atheroma seen in the thoracic aorta.     Patient successfully converted to sinus rhythm with synchronized  200 J of direct current cardioversion via AP pads.    DIAGNOSIS/IMPRESSION:  Atrial fibrillation with successful cardioversion to normal sinus rhythm. Moderate to severe mitral regurgitation.    PLAN OF CARE:  [x] Continue anticoagulation xarelto.  [x] Continue metoprolol succinate 100 mg po daily but discontinue cardizem CD upon discharge.

## 2021-05-15 NOTE — CONSULT NOTE ADULT - ATTENDING COMMENTS
## Persistent AF  ## Moderate to severe MR  ## HFrEF    - Diuresis as per cardio, strict I & O, daily weight  - Echo to assess MR  - We will consider LEEANN/DCCV with AAD such as amiodarone for short-term  - If eccentric MR is the culprit, it may need surgical intervention. If MR is central, we will consider rhythm control with AAD or ablation- outpatient.  - Continue Metoprolol and Xarelto
Assessment:    Afib with RVR on xarelto s/p recent cardioversion, went back to afib around 5/14 4:33 AM  Aspiration Pneumonia  Severe MR  HFrEF  Clinically euvolemic/dry    Plan:  c/w anticoagulation for Afib, increased hospital xarelto dose to 20 mg po q24h  c/w beta-blocker, restart cardizem (previously taking 120 mg CD)  c/w antibiotic treatment for aspiration pneumonia, consider IV zosyn.  ID, pulm evaluation  rate control strategy with anticoagulation for AF  Medical therapy for MR, CHF - avoid fluid overload  outpatient follow-up with Dr. Hairston

## 2021-05-15 NOTE — PROGRESS NOTE ADULT - SUBJECTIVE AND OBJECTIVE BOX
MAISHA VELASQUEZ 76y Female      Patient is a 76y old  Female who presents with a chief complaint of Hypoxia (14 May 2021 11:37)        INTERVAL HPI/OVERNIGHT EVENTS: No acute events overnight. Patient was seen and evaluated at the bedside. The patient denies pain. Vitals stable. Patient denies fever/chills, chest pain, shortness of breath, abdominal pain, headaches, nausea/vomiting, and diarrhea/constipation.      PHYSICAL EXAM:  GENERAL: NAD  HEAD:  Normocephalic  EYES:  conjunctiva and sclera clear  ENMT: Moist mucous membranes  NECK: Supple  NERVOUS SYSTEM:  Alert, awake  CHEST/LUNG: Good air exchange bilaterally, no wheeze  HEART: Regular rate and rhythm        Vital Signs Last 24 Hrs  T(C): 36.9 (15 May 2021 05:20), Max: 36.9 (14 May 2021 12:10)  T(F): 98.4 (15 May 2021 05:20), Max: 98.5 (14 May 2021 12:10)  HR: 96 (15 May 2021 05:20) (86 - 125)  BP: 128/64 (15 May 2021 05:20) (103/64 - 161/87)  BP(mean): --  RR: 19 (15 May 2021 05:20) (18 - 20)  SpO2: 92% (15 May 2021 08:16) (92% - 98%)      Consultant(s) Notes Reviewed:  [X] YES  [ ] NO  Care Discussed with Consultants/Other Providers [X] YES  [ ] NO  Imaging Personally Reviewed:  [X] YES  [ ] NO      LABS:                        15.9   12.52 )-----------( 273      ( 15 May 2021 09:04 )             48.2     05-14    142  |  105  |  20  ----------------------------<  143<H>  4.3   |  23  |  1.1    Ca    9.5      14 May 2021 00:00    TPro  6.5  /  Alb  4.3  /  TBili  0.4  /  DBili  x   /  AST  18  /  ALT  12  /  AlkPhos  93  05-14    PT/INR - ( 15 May 2021 09:04 )   PT: 26.80 sec;   INR: 2.33 ratio         PTT - ( 15 May 2021 09:04 )  PTT:42.6 sec      CAPILLARY BLOOD GLUCOSE               MAISHA VELASQUEZ 76y Female      Patient is a 76y old  Female who presents with a chief complaint of Hypoxia (14 May 2021 11:37)    INTERVAL HPI/OVERNIGHT EVENTS: No acute events overnight. Patient was seen and evaluated at the bedside. The patient denies pain. Vitals stable. Patient denies fever/chills, chest pain, shortness of breath, abdominal pain, headaches, nausea/vomiting, and diarrhea/constipation.      PHYSICAL EXAM:  GENERAL: NAD  HEAD:  Normocephalic  EYES:  conjunctiva and sclera clear  ENMT: Moist mucous membranes  NECK: Supple  NERVOUS SYSTEM:  Alert, awake  CHEST/LUNG: Good air exchange bilaterally, no wheeze  HEART: Regular rate and rhythm        Vital Signs Last 24 Hrs  T(C): 36.9 (15 May 2021 05:20), Max: 36.9 (14 May 2021 12:10)  T(F): 98.4 (15 May 2021 05:20), Max: 98.5 (14 May 2021 12:10)  HR: 96 (15 May 2021 05:20) (86 - 125)  BP: 128/64 (15 May 2021 05:20) (103/64 - 161/87)  BP(mean): --  RR: 19 (15 May 2021 05:20) (18 - 20)  SpO2: 92% (15 May 2021 08:16) (92% - 98%)      Consultant(s) Notes Reviewed:  [X] YES  [ ] NO  Care Discussed with Consultants/Other Providers [X] YES  [ ] NO  Imaging Personally Reviewed:  [X] YES  [ ] NO      LABS:                        15.9   12.52 )-----------( 273      ( 15 May 2021 09:04 )             48.2     05-14    142  |  105  |  20  ----------------------------<  143<H>  4.3   |  23  |  1.1    Ca    9.5      14 May 2021 00:00    TPro  6.5  /  Alb  4.3  /  TBili  0.4  /  DBili  x   /  AST  18  /  ALT  12  /  AlkPhos  93  05-14    PT/INR - ( 15 May 2021 09:04 )   PT: 26.80 sec;   INR: 2.33 ratio         PTT - ( 15 May 2021 09:04 )  PTT:42.6 sec      CAPILLARY BLOOD GLUCOSE

## 2021-05-15 NOTE — PROGRESS NOTE ADULT - ASSESSMENT
76 years old female w/ Pmhx of HFrEF, Afib (on xarelto) s/p LEEANN/DCCV by Dr. Bergman 5/12 successfully yesterday came in with shortness of breath.    Impression   Acute hypoxemic respiratory failure  RLL opacity - Possible aspiration PNA vs pneumonitis   Paroxysmal Afibb on xarelto s/p LEEANN/DCCV 5/12  HO HFrEF (LVEF 35%)  - LEEANN 5/12 shows LVEF 35%, severe MR  - euvolemic on exam    Plan  - c/w BB, xarelto  - repeat TTE, check EKG  - telemetry shows rate controlled afib    - check procalcitonin  - start Unasyn 3g IV q6, likely more aspiration pneumonitits as CXR is clearing up already   - strict I and Os, daily wt, restrict fluid  - f/u cardiology, check echo   - EPS follow-up for management of relapsed afib     # Diet: DASH/TLC  # DVT Prophylaxis: xarelto  # GI Prophylaxis: protonix  # Activity: IAT  # Dispo: Acute  # Code Status Fullcode:         76 years old female w/ Pmhx of HFrEF, Afib (on xarelto) s/p LEEANN/DCCV by Dr. Bergman 5/12 successfully yesterday came in with shortness of breath.    Impression   Acute hypoxemic respiratory failure  RLL opacity - Possible aspiration PNA vs pneumonitis   Paroxysmal Afibb on xarelto s/p LEEANN/DCCV 5/12  HO HFrEF (LVEF 35%)  - LEEANN 5/12 shows LVEF 35%, severe MR  - euvolemic on exam    Plan  - c/w BB, xarelto  - repeat TTE, check EKG  - telemetry shows rate controlled afib    - check procalcitonin  - cont Rocephine, likely more aspiration pneumonitits as CXR is clearing up already ,   - strict I and Os, daily wt, restrict fluid  - f/u cardiology, check echo   - EPS follow-up for management of relapsed afib     # Diet: DASH/TLC  # DVT Prophylaxis: xarelto  # GI Prophylaxis: protonix  # Activity: IAT  # Dispo: Acute  # Code Status Fullcode:

## 2021-05-15 NOTE — CONSULT NOTE ADULT - ASSESSMENT
Cardiologist: Dr. Hairston    76 years old female w/ Pmhx of HFrEF, Afib (on xarelto, newly diagnosed) s/p LEEANN/DCCV by Dr. Bergman 5/12 who is now admitted with hypoxic respiratory failure 2/2 PNA vs pneumonitis. Found to be in AF, rate controlled.     Impression:  Acute hypoxemic respiratory failure  RLL opacity - Possible aspiration PNA vs pneumonitis   Paroxysmal Afibb on xarelto s/p LEEANN/DCCV 5/12, currently rate controlled  Hx HFrEF (LVEF 35%) -LEEANN 5/12 shows LVEF 35%, severe MR  COVID neg 5/14    Plan:  - Echo to assess MR, if primary MR then will need further evaluation and treatment  - Recommend DCCV/LEEANN , Please c/s cardiology, Dr. Hairston  - Will likely need antiarrythmic  - Cont Xarelto uninterrupted  - Cont metoprolol 100 mg PO Q12  - Will discuss need for possible ablation in the future

## 2021-05-16 LAB
ALBUMIN SERPL ELPH-MCNC: 3.7 G/DL — SIGNIFICANT CHANGE UP (ref 3.5–5.2)
ALP SERPL-CCNC: 87 U/L — SIGNIFICANT CHANGE UP (ref 30–115)
ALT FLD-CCNC: 13 U/L — SIGNIFICANT CHANGE UP (ref 0–41)
ANION GAP SERPL CALC-SCNC: 15 MMOL/L — HIGH (ref 7–14)
AST SERPL-CCNC: 16 U/L — SIGNIFICANT CHANGE UP (ref 0–41)
BILIRUB SERPL-MCNC: 0.5 MG/DL — SIGNIFICANT CHANGE UP (ref 0.2–1.2)
BUN SERPL-MCNC: 20 MG/DL — SIGNIFICANT CHANGE UP (ref 10–20)
CALCIUM SERPL-MCNC: 9.3 MG/DL — SIGNIFICANT CHANGE UP (ref 8.5–10.1)
CHLORIDE SERPL-SCNC: 101 MMOL/L — SIGNIFICANT CHANGE UP (ref 98–110)
CO2 SERPL-SCNC: 25 MMOL/L — SIGNIFICANT CHANGE UP (ref 17–32)
CREAT SERPL-MCNC: 0.9 MG/DL — SIGNIFICANT CHANGE UP (ref 0.7–1.5)
GLUCOSE SERPL-MCNC: 84 MG/DL — SIGNIFICANT CHANGE UP (ref 70–99)
MAGNESIUM SERPL-MCNC: 2.1 MG/DL — SIGNIFICANT CHANGE UP (ref 1.8–2.4)
POTASSIUM SERPL-MCNC: 3.9 MMOL/L — SIGNIFICANT CHANGE UP (ref 3.5–5)
POTASSIUM SERPL-SCNC: 3.9 MMOL/L — SIGNIFICANT CHANGE UP (ref 3.5–5)
PROT SERPL-MCNC: 6 G/DL — SIGNIFICANT CHANGE UP (ref 6–8)
SODIUM SERPL-SCNC: 141 MMOL/L — SIGNIFICANT CHANGE UP (ref 135–146)

## 2021-05-16 PROCEDURE — 99233 SBSQ HOSP IP/OBS HIGH 50: CPT

## 2021-05-16 PROCEDURE — 93306 TTE W/DOPPLER COMPLETE: CPT | Mod: 26

## 2021-05-16 RX ORDER — FUROSEMIDE 40 MG
40 TABLET ORAL DAILY
Refills: 0 | Status: DISCONTINUED | OUTPATIENT
Start: 2021-05-16 | End: 2021-05-18

## 2021-05-16 RX ORDER — ACETAMINOPHEN 500 MG
650 TABLET ORAL EVERY 6 HOURS
Refills: 0 | Status: DISCONTINUED | OUTPATIENT
Start: 2021-05-16 | End: 2021-05-18

## 2021-05-16 RX ORDER — AMPICILLIN SODIUM AND SULBACTAM SODIUM 250; 125 MG/ML; MG/ML
1.5 INJECTION, POWDER, FOR SUSPENSION INTRAMUSCULAR; INTRAVENOUS EVERY 6 HOURS
Refills: 0 | Status: DISCONTINUED | OUTPATIENT
Start: 2021-05-16 | End: 2021-05-16

## 2021-05-16 RX ADMIN — CHLORHEXIDINE GLUCONATE 1 APPLICATION(S): 213 SOLUTION TOPICAL at 05:13

## 2021-05-16 RX ADMIN — Medication 100 MILLIGRAM(S): at 17:40

## 2021-05-16 RX ADMIN — CEFTRIAXONE 100 MILLIGRAM(S): 500 INJECTION, POWDER, FOR SOLUTION INTRAMUSCULAR; INTRAVENOUS at 05:12

## 2021-05-16 RX ADMIN — Medication 40 MILLIGRAM(S): at 17:40

## 2021-05-16 RX ADMIN — RIVAROXABAN 20 MILLIGRAM(S): KIT at 17:40

## 2021-05-16 RX ADMIN — Medication 100 MILLIGRAM(S): at 05:13

## 2021-05-16 NOTE — PROGRESS NOTE ADULT - SUBJECTIVE AND OBJECTIVE BOX
INTERVAL HPI/OVERNIGHT EVENTS:    SUBJECTIVE: Patient seen and examined at bedside.     no cp, sob, abd pain, fever  no sob, orthopnea, pnd, cough  OBJECTIVE:    VITAL SIGNS:  Vital Signs Last 24 Hrs  T(C): 35.9 (16 May 2021 05:05), Max: 37.1 (15 May 2021 15:03)  T(F): 96.7 (16 May 2021 05:05), Max: 98.7 (15 May 2021 15:03)  HR: 96 (16 May 2021 05:05) (91 - 100)  BP: 137/72 (16 May 2021 05:05) (127/72 - 137/72)  BP(mean): --  RR: 18 (16 May 2021 05:05) (18 - 19)  SpO2: 92% (16 May 2021 12:29) (92% - 97%)      PHYSICAL EXAM:    General: NAD  HEENT: NC/AT; PERRL, clear conjunctiva  Neck: supple  Respiratory: base crackles  Cardiovascular: +S1/S2; RRR  Abdomen: soft, NT/ND; +BS x4  Extremities: WWP, 2+ peripheral pulses b/l; no LE edema  Skin: normal color and turgor; no rash  Neurological:    MEDICATIONS:  MEDICATIONS  (STANDING):  chlorhexidine 4% Liquid 1 Application(s) Topical <User Schedule>  furosemide    Tablet 40 milliGRAM(s) Oral daily  metoprolol tartrate 100 milliGRAM(s) Oral two times a day  rivaroxaban 20 milliGRAM(s) Oral with dinner    MEDICATIONS  (PRN):      ALLERGIES:  Allergies    No Known Allergies    Intolerances        LABS:                        15.9   12.52 )-----------( 273      ( 15 May 2021 09:04 )             48.2     Hemoglobin: 15.9 g/dL (05-15 @ 09:04)  Hemoglobin: 14.7 g/dL (05-14 @ 00:00)    CBC Full  -  ( 15 May 2021 09:04 )  WBC Count : 12.52 K/uL  RBC Count : 5.73 M/uL  Hemoglobin : 15.9 g/dL  Hematocrit : 48.2 %  Platelet Count - Automated : 273 K/uL  Mean Cell Volume : 84.1 fL  Mean Cell Hemoglobin : 27.7 pg  Mean Cell Hemoglobin Concentration : 33.0 g/dL  Auto Neutrophil # : 10.03 K/uL  Auto Lymphocyte # : 1.36 K/uL  Auto Monocyte # : 0.81 K/uL  Auto Eosinophil # : 0.18 K/uL  Auto Basophil # : 0.08 K/uL  Auto Neutrophil % : 80.1 %  Auto Lymphocyte % : 10.9 %  Auto Monocyte % : 6.5 %  Auto Eosinophil % : 1.4 %  Auto Basophil % : 0.6 %    05-16    141  |  101  |  20  ----------------------------<  84  3.9   |  25  |  0.9    Ca    9.3      16 May 2021 04:30  Mg     2.1     05-16    TPro  6.0  /  Alb  3.7  /  TBili  0.5  /  DBili  x   /  AST  16  /  ALT  13  /  AlkPhos  87  05-16    Creatinine Trend: 0.9<--, 0.9<--, 1.1<--, 1.0<--  LIVER FUNCTIONS - ( 16 May 2021 04:30 )  Alb: 3.7 g/dL / Pro: 6.0 g/dL / ALK PHOS: 87 U/L / ALT: 13 U/L / AST: 16 U/L / GGT: x           PT/INR - ( 15 May 2021 09:04 )   PT: 26.80 sec;   INR: 2.33 ratio         PTT - ( 15 May 2021 09:04 )  PTT:42.6 sec    hs Troponin:              CSF:                      EKG:   MICROBIOLOGY:    IMAGING:      Labs, imaging, EKG personally reviewed    RADIOLOGY & ADDITIONAL TESTS: Reviewed.

## 2021-05-16 NOTE — PROGRESS NOTE ADULT - ASSESSMENT
76F PMHx HFrEF, AFib on xarelto s/p cardioversion here with acute hypoxic resp failure.    #Acute hypoxic resp failure, suspect due to volume overload  chronic bl opacities/ effusion on cxr  s/p iv lasix 40; start lasix 40 po  procal 0.25  monitor off abx  hypoxia resolved, satting well on ra  #AFib, persistent despite cardioversion 5/12  f/u ep  rate controlled on lopressor 100 bid  xarelto  #HFrEF of 35%  lasix as above  outpt f/u for entresto  #DVT ppx  xarelto    #Progress Note Handoff:  Pending (specify):  Consults_________, Tests________, Test Results_______, Other__f/u ep_______  Family discussion:d/w pt at bedside re: treatment plan, primary dx  Disposition: Home___/SNF___/Other________/Unknown at this time___x_____

## 2021-05-17 PROCEDURE — 93010 ELECTROCARDIOGRAM REPORT: CPT

## 2021-05-17 PROCEDURE — 92960 CARDIOVERSION ELECTRIC EXT: CPT | Mod: 26

## 2021-05-17 PROCEDURE — 99233 SBSQ HOSP IP/OBS HIGH 50: CPT

## 2021-05-17 PROCEDURE — 71045 X-RAY EXAM CHEST 1 VIEW: CPT | Mod: 26

## 2021-05-17 RX ORDER — METOPROLOL TARTRATE 50 MG
50 TABLET ORAL
Refills: 0 | Status: DISCONTINUED | OUTPATIENT
Start: 2021-05-17 | End: 2021-05-18

## 2021-05-17 RX ORDER — AMIODARONE HYDROCHLORIDE 400 MG/1
150 TABLET ORAL ONCE
Refills: 0 | Status: COMPLETED | OUTPATIENT
Start: 2021-05-17 | End: 2021-05-17

## 2021-05-17 RX ORDER — AMIODARONE HYDROCHLORIDE 400 MG/1
0.5 TABLET ORAL
Qty: 900 | Refills: 0 | Status: DISCONTINUED | OUTPATIENT
Start: 2021-05-17 | End: 2021-05-18

## 2021-05-17 RX ORDER — AMIODARONE HYDROCHLORIDE 400 MG/1
1 TABLET ORAL
Qty: 900 | Refills: 0 | Status: DISCONTINUED | OUTPATIENT
Start: 2021-05-17 | End: 2021-05-18

## 2021-05-17 RX ADMIN — RIVAROXABAN 20 MILLIGRAM(S): KIT at 17:28

## 2021-05-17 RX ADMIN — AMIODARONE HYDROCHLORIDE 600 MILLIGRAM(S): 400 TABLET ORAL at 08:59

## 2021-05-17 RX ADMIN — Medication 100 MILLIGRAM(S): at 05:24

## 2021-05-17 RX ADMIN — AMIODARONE HYDROCHLORIDE 33.3 MG/MIN: 400 TABLET ORAL at 09:15

## 2021-05-17 RX ADMIN — AMIODARONE HYDROCHLORIDE 16.7 MG/MIN: 400 TABLET ORAL at 17:28

## 2021-05-17 RX ADMIN — Medication 40 MILLIGRAM(S): at 05:24

## 2021-05-17 RX ADMIN — CHLORHEXIDINE GLUCONATE 1 APPLICATION(S): 213 SOLUTION TOPICAL at 05:24

## 2021-05-17 NOTE — PROGRESS NOTE ADULT - ATTENDING COMMENTS
## Persistent AF  ## Moderate to severe MR  ## HFrEF    - Diuresis as per cardio, strict I & O, daily weight  - Echo to assess MR  - We will consider LEEANN/DCCV with AAD such as amiodarone for short-term  - If eccentric MR is the culprit, it may need surgical intervention. If MR is central, we will consider rhythm control with AAD or ablation- outpatient.  - Continue Metoprolol and Xarelto .
pt is feeling better today. still feels slight congestion  clinically euvolemic  no sob, no cp, no palpitation  cont current meds  anticipate dc in am  dw pt , she understands and agrees with plan  if stable change abx to po in am   dw resident. resident notes reviewed and edited.      #Progress Note Handoff  Pending (specify):  tests: cultures   Family discussion: neil pt, fully aaox3 , understands plan and agrees. anticipate dc in am   Disposition: Home__
s/p DCCV - in sinus.  - Continue Amiodarone, Xarelto, Metoprolol  - No ACEI/ARB/ARNI for now due to low BP. Reassess as outpatient  - Anticipate DC home in am if ok otherwise  - OP f-up in 1 month.   - Repeat echo as OP.

## 2021-05-17 NOTE — PROGRESS NOTE ADULT - SUBJECTIVE AND OBJECTIVE BOX
SUBJECTIVE:    Patient is a 76y old  Female who presents with a chief complaint of Hypoxia (17 May 2021 12:50)    Currently admitted to medicine with the primary diagnosis of CHF (congestive heart failure)       Today is hospital day 3d. Patient was seen and examined at bedside. This morning she is resting comfortably in bed and reports no new issues or overnight events. Denies any current complaints.     PAST MEDICAL & SURGICAL HISTORY  PAST MEDICAL & SURGICAL HISTORY:  Afib    H/O varicose vein stripping    Lumbar herniated disc      SOCIAL HISTORY:    ALLERGIES:  No Known Allergies    MEDICATIONS:  STANDING MEDICATIONS  aMIOdarone Infusion 1 mG/Min IV Continuous <Continuous>  aMIOdarone Infusion 0.5 mG/Min IV Continuous <Continuous>  chlorhexidine 4% Liquid 1 Application(s) Topical <User Schedule>  furosemide    Tablet 40 milliGRAM(s) Oral daily  metoprolol tartrate 50 milliGRAM(s) Oral two times a day  rivaroxaban 20 milliGRAM(s) Oral with dinner    PRN MEDICATIONS  acetaminophen    Suspension .. 650 milliGRAM(s) Oral every 6 hours PRN    VITALS:   T(F): 97  HR: 51  BP: 103/56  RR: 18  SpO2: 94%    LABS:    05-16    141  |  101  |  20  ----------------------------<  84  3.9   |  25  |  0.9    Ca    9.3      16 May 2021 04:30  Mg     2.1     05-16    TPro  6.0  /  Alb  3.7  /  TBili  0.5  /  DBili  x   /  AST  16  /  ALT  13  /  AlkPhos  87  05-16              Culture - Blood (collected 15 May 2021 09:04)  Source: .Blood None  Preliminary Report (16 May 2021 20:00):    No growth to date.          RADIOLOGY:    PHYSICAL EXAM:  GENERAL: NAD, speaks in full sentences, no signs of respiratory distress  HEAD: Atraumatic  NECK: Supple  CHEST/LUNG: Clear to auscultation bilaterally  HEART: S1, S2; RRR; No murmurs, rubs, or gallops  ABDOMEN: BS+; Soft, Non-tender, Non-distended  EXTREMITIES:  2+ Peripheral Pulses

## 2021-05-17 NOTE — PROGRESS NOTE ADULT - SUBJECTIVE AND OBJECTIVE BOX
INTERVAL HPI/OVERNIGHT EVENTS:  No acute events overnight. Pt off O2, O2 sats 94% on room air. Remains in AF, rate controlled. NPO.   Patient without complaints at current time.     MEDICATIONS  (STANDING):  aMIOdarone Infusion 1 mG/Min (33.3 mL/Hr) IV Continuous <Continuous>  aMIOdarone Infusion 0.5 mG/Min (16.7 mL/Hr) IV Continuous <Continuous>  chlorhexidine 4% Liquid 1 Application(s) Topical <User Schedule>  furosemide    Tablet 40 milliGRAM(s) Oral daily  metoprolol tartrate 100 milliGRAM(s) Oral two times a day  rivaroxaban 20 milliGRAM(s) Oral with dinner    MEDICATIONS  (PRN):  acetaminophen    Suspension .. 650 milliGRAM(s) Oral every 6 hours PRN Moderate Pain (4 - 6)      Allergies    No Known Allergies    Intolerances        REVIEW OF SYSTEMS    [x] A ten-point review of systems was otherwise negative except as noted.  [ ] Due to altered mental status/intubation, subjective information were not able to be obtained from the patient. History was obtained, to the extent possible, from review of the chart and collateral sources of information.      Vital Signs Last 24 Hrs  T(C): 36.2 (17 May 2021 05:20), Max: 36.6 (16 May 2021 19:16)  T(F): 97.1 (17 May 2021 05:20), Max: 97.8 (16 May 2021 19:16)  HR: 69 (17 May 2021 09:13) (69 - 107)  BP: 115/69 (17 May 2021 09:13) (109/64 - 142/81)  BP(mean): --  RR: 18 (17 May 2021 05:20) (18 - 18)  SpO2: 94% (17 May 2021 09:13) (92% - 96%)    05-16-21 @ 07:01  -  05-17-21 @ 07:00  --------------------------------------------------------  IN: 320 mL / OUT: 1720 mL / NET: -1400 mL    05-17-21 @ 07:01 - 05-17-21 @ 10:06  --------------------------------------------------------  IN: 510 mL / OUT: 300 mL / NET: 210 mL        Physical Exam  GENERAL: In no apparent distress, well nourished, and hydrated.  HEART: Irregular rate and rhythm; No murmurs, rubs, or gallops.  PULMONARY: Clear to auscultation and perfusion.  No rales, wheezing, or rhonchi bilaterally.  ABDOMEN: Soft, Nontender, Nondistended; Bowel sounds present  EXTREMITIES:  2+ Peripheral Pulses, No clubbing, cyanosis, or edema  NEUROLOGICAL: AO x4, MCCORMICK, speech clear.     LABS:    05-16    141  |  101  |  20  ----------------------------<  84  3.9   |  25  |  0.9    Ca    9.3      16 May 2021 04:30  Mg     2.1     05-16    TPro  6.0  /  Alb  3.7  /  TBili  0.5  /  DBili  x   /  AST  16  /  ALT  13  /  AlkPhos  87  05-16 05-16-21 @ 07:01 - 05-17-21 @ 07:00  --------------------------------------------------------  IN: 320 mL / OUT: 1720 mL / NET: -1400 mL    05-17-21 @ 07:01 - 05-17-21 @ 10:06  --------------------------------------------------------  IN: 510 mL / OUT: 300 mL / NET: 210 mL    05-16-21 @ 07:01 - 05-17-21 @ 07:00  --------------------------------------------------------  IN: 320 mL / OUT: 1720 mL / NET: -1400 mL    05-17-21 @ 07:01  -  05-17-21 @ 10:06  --------------------------------------------------------  IN: 510 mL / OUT: 300 mL / NET: 210 mL        RADIOLOGY & ADDITIONAL TESTS:  < from: TTE Echo Complete w/o Contrast w/ Doppler (05.16.21 @ 07:16) >  Summary:   1. Left ventricular ejection fraction, by visual estimation, is 35 to 40%.   2. Mildly increased LV wall thickness.   3. Moderately enlarged left atrium.   4. Normal right atrial size.   5. Mild thickening of the anterior and posterior mitral valve leaflets.   6. Moderate mitral valve regurgitation.   7. Mild-moderate tricuspid regurgitation.   8. Mild pulmonic valve regurgitation.   9. Estimated pulmonary artery systolic pressure is 37.0 mmHg assuming a right atrial pressure of 3 mmHg, which is consistent with borderline pulmonary hypertension.    PHYSICIAN INTERPRETATION:  Left Ventricle: The left ventricular internal cavity size is normal. Left ventricular wall thickness is mildly increased. Left ventricular ejection fraction, by visual estimation, is 35 to 40%.  Right Ventricle: Normal right ventricular size and function.  Left Atrium: Moderately enlarged left atrium.  Right Atrium: Normal right atrialsize.  Mitral Valve: Structurally normal mitral valve, with normal leaflet excursion. Mild thickening of the anterior and posterior mitral valve leaflets. Moderate mitral valve regurgitation is seen.  Tricuspid Valve: Structurally normal tricuspid valve, with normal leaflet excursion. Mild-moderate tricuspid regurgitation is visualized. Estimated pulmonary artery systolic pressure is 37.0 mmHg assuming a right atrial pressure of 3 mmHg, which is consistent with borderline pulmonary hypertension.  Aortic Valve: The aortic valve is trileaflet. No evidence of aortic stenosis. No evidence of aortic valve regurgitation is seen.  Pulmonic Valve: Structurally normal pulmonic valve, with normal leaflet excursion. Mild pulmonic valve regurgitation.  Aorta: The aortic root is normal in size and structure.  Venous: The inferior vena cava was normal sized, with respiratory size variation greater than 50%.    < end of copied text >

## 2021-05-17 NOTE — PROGRESS NOTE ADULT - ASSESSMENT
76 years old female w/ Pmhx of HFrEF, Afib (on xarelto) s/p LEEANN/DCCV by Dr. Bergman 5/12 successfully yesterday came in with shortness of breath.    #Atrial Fibrillation   - s/p cardioversion 5/12  - lopressor decreased to 50mg BID  - Started on amnio drip  - c/p repeat cardioversion today   - f/u cardio and EP   - Monitor electrolytes, Keep K >4, Mag >2   - f/u EP OP 2-3 weeks with Dr. Vines   - c/w tele monitoring     #Acute hypoxic respiratory failure, 2/2 to volume overload  #Chronic b/l opacities effusions o nCXR   #HFrEF   - hypoxia resolved, currently on room air   - lasix 40mg PO  - ECHO: EF 35%  - s/p rocephin, abx on hold due to low suspicion of PNA   - procal 0.25, monitor off abx   - repeat cxr in am   - OP f/u to start entresto     # Diet: DASH/TLC  # DVT Prophylaxis: xarelto  # GI Prophylaxis: protonix  # Activity: IAT  # Dispo: Acute  # Code Status Fullcode:

## 2021-05-17 NOTE — CHART NOTE - NSCHARTNOTEFT_GEN_A_CORE
CARDIOVERSION PROCEDURE NOTE    PRE-OP DIAGNOSIS:  AF    POST-OP DIAGNOSIS:  SB    PROCEDURE:  DC Cardioversion    Primary Physician:  Dr. Karin Jensen  Assistant:  Dr. Ocampo    ANESTHESIA TYPE  [ ]  General Anesthesia  [X] Conscious Sedation  [ ]  Local/Regional    CONDITION  [ ] Critical  [ ] Serious  [ ] Fair  [X] Good    SPECIMENS REMOVED (IF APPLICABLE):  N/A    IMPLANTS (IF APPLICABLE):  N/A    ESTIMATED BLOOD LOSS:  None    COMPLICATIONS:  None      Risks and benefits of procedures were explained and informed consent was obtained.  Refer to Anesthesia documentation for sedation details.  Patient tolerated the procedure well without any immediate complications.    Successful cardioversion 200 J x 1 to SR.    Findings and plan of care discussed with patient/.
pt is feeling much better  cardio and pulm note appreciated but will not give unasyn ( giving rocephine) and will not resume cardizem ( pt was told by her EP and her cardio Dr ruano to stop cardizem , which I agree with )   cont metoprolol  full code
Pre-procedure Assessment:  Patient seen and examined. I agree with the history and physical which I have reviewed and noted any changes below.  05-17-21 @ 10:56

## 2021-05-17 NOTE — PROGRESS NOTE ADULT - ASSESSMENT
Cardiologist: Dr. Hairston    76 years old female w/ PMH of HFrEF, Afib (on xarelto, newly diagnosed) s/p LEEANN/DCCV by Dr. Bergman 5/12 who is now admitted with hypoxic respiratory failure 2/2 PNA vs pneumonitis. Found to be in AF, rate controlled.     Impression:  Acute hypoxemic respiratory failure  RLL opacity - Possible aspiration PNA vs pneumonitis   Paroxysmal Afib on xarelto s/p LEEANN/DCCV 5/12, currently rate controlled  Hx HFrEF (LVEF 35%) -LEEANN 5/12 shows LVEF 35%, severe MR  COVID neg 5/14    Plan:  - LEEANN/DCCV today  - NPO  - Start amio bolus f/b gtt for 24 hour load as per protocol  - Cont Xarelto uninterrupted  - Cont metoprolol 100 mg PO Q12  - Will discuss need for possible ablation in the future, f/u as outpatient with Dr. Vines in 2-3 weeks Cardiologist: Dr. Hairston    76 years old female w/ PMH of HFrEF, Afib (on xarelto, newly diagnosed) s/p LEEANN/DCCV by Dr. Bergman 5/12 who is now admitted with hypoxic respiratory failure 2/2 PNA vs pneumonitis. Found to be in AF, rate controlled.     Impression:  Acute hypoxemic respiratory failure, volume overload  Paroxysmal Afib on xarelto s/p LEEANN/DCCV 5/12, currently rate controlled  Hx HFrEF (LVEF 35%) -LEEANN 5/12 shows LVEF 35%, severe MR  COVID neg 5/14    Plan:  - LEEANN/DCCV today  - NPO  - Start amio bolus f/b gtt for 24 hour load as per protocol  - Cont Xarelto uninterrupted  - Cont metoprolol 100 mg PO Q12  - Will discuss need for possible ablation in the future, f/u as outpatient with Dr. Vines in 2-3 weeks

## 2021-05-17 NOTE — PRE-ANESTHESIA EVALUATION ADULT - BMI (KG/M2)
Discharge instructions with ptJimenez Harrison ordered explained and given. robert very well. No pain. Explained rx. Home with family.      Geraldo Ruiz RN  06/05/19 [id number]
Finishing triage. No pain.      Kavitha Pepper RN  06/05/19 [id number]
27.2

## 2021-05-17 NOTE — PROGRESS NOTE ADULT - SUBJECTIVE AND OBJECTIVE BOX
INTERVAL HPI/OVERNIGHT EVENTS:    SUBJECTIVE: Patient seen and examined at bedside.     no cp, sob, abd pain, fever  no sob, orthopnea, pnd, cough    OBJECTIVE:    VITAL SIGNS:  Vital Signs Last 24 Hrs  T(C): 35.9 (17 May 2021 11:23), Max: 36.6 (16 May 2021 19:16)  T(F): 96.7 (17 May 2021 10:54), Max: 97.8 (16 May 2021 19:16)  HR: 89 (17 May 2021 11:23) (69 - 107)  BP: 117/76 (17 May 2021 11:23) (109/64 - 142/81)  BP(mean): --  RR: 18 (17 May 2021 11:23) (18 - 18)  SpO2: 94% (17 May 2021 11:23) (94% - 96%)      PHYSICAL EXAM:    General: NAD  HEENT: NC/AT; PERRL, clear conjunctiva  Neck: supple  Respiratory: base crackles  Cardiovascular: +S1/S2; RRR  Abdomen: soft, NT/ND; +BS x4  Extremities: WWP, 2+ peripheral pulses b/l; no LE edema  Skin: normal color and turgor; no rash  Neurological:    MEDICATIONS:  MEDICATIONS  (STANDING):  aMIOdarone Infusion 1 mG/Min (33.3 mL/Hr) IV Continuous <Continuous>  aMIOdarone Infusion 0.5 mG/Min (16.7 mL/Hr) IV Continuous <Continuous>  chlorhexidine 4% Liquid 1 Application(s) Topical <User Schedule>  furosemide    Tablet 40 milliGRAM(s) Oral daily  metoprolol tartrate 100 milliGRAM(s) Oral two times a day  rivaroxaban 20 milliGRAM(s) Oral with dinner    MEDICATIONS  (PRN):  acetaminophen    Suspension .. 650 milliGRAM(s) Oral every 6 hours PRN Moderate Pain (4 - 6)      ALLERGIES:  Allergies    No Known Allergies    Intolerances        LABS:    Hemoglobin: 15.9 g/dL (05-15 @ 09:04)  Hemoglobin: 14.7 g/dL (05-14 @ 00:00)      05-16    141  |  101  |  20  ----------------------------<  84  3.9   |  25  |  0.9    Ca    9.3      16 May 2021 04:30  Mg     2.1     05-16    TPro  6.0  /  Alb  3.7  /  TBili  0.5  /  DBili  x   /  AST  16  /  ALT  13  /  AlkPhos  87  05-16    Creatinine Trend: 0.9<--, 0.9<--, 1.1<--, 1.0<--  LIVER FUNCTIONS - ( 16 May 2021 04:30 )  Alb: 3.7 g/dL / Pro: 6.0 g/dL / ALK PHOS: 87 U/L / ALT: 13 U/L / AST: 16 U/L / GGT: x               hs Troponin:              CSF:                      EKG:   MICROBIOLOGY:    Culture - Blood (collected 15 May 2021 09:04)  Source: .Blood None  Preliminary Report (16 May 2021 20:00):    No growth to date.      IMAGING:      Labs, imaging, EKG personally reviewed    RADIOLOGY & ADDITIONAL TESTS: Reviewed.

## 2021-05-17 NOTE — PROGRESS NOTE ADULT - ASSESSMENT
76F PMHx HFrEF, AFib on xarelto s/p cardioversion here with acute hypoxic resp failure.    #Acute hypoxic resp failure, suspect due to volume overload  chronic bl opacities/ effusion on cxr  cont lasix 40 po; not on home lasix  procal 0.25  monitor off abx  hypoxia resolved, satting well on ra  #AFib, persistent despite cardioversion 5/12  s/p cardioversion today  amio load per ep  lopressor 100 bid  xarelto  #HFrEF of 35%  lasix as above  outpt f/u for entresto  #DVT ppx  xarelto    #Progress Note Handoff:  Pending (specify):  Consults_________, Tests________, Test Results_______, Other__f/u ep_______  Family discussion:d/w pt at bedside re: treatment plan, primary dx  Disposition: Home___/SNF___/Other________/Unknown at this time___x_____       76F PMHx HFrEF, AFib on xarelto s/p cardioversion here with acute hypoxic resp failure.    #Acute hypoxic resp failure, suspect due to volume overload  chronic bl opacities/ effusion on cxr  hypoxia resolved, satting well on ra  repeat cxr  cont lasix 40 po; not on home lasix  procal 0.25  monitor off abx  #AFib, persistent despite cardioversion 5/12  s/p cardioversion today  amio load per ep  may need ablation outpt  lopressor 100 bid  xarelto  #HFrEF of 35%  lasix as above  outpt f/u for entresto  #DVT ppx  xarelto    #Progress Note Handoff:  Pending (specify):  Consults_________, Tests________, Test Results_______, Other__f/u ep_______  Family discussion:d/w pt at bedside re: treatment plan, primary dx  Disposition: Home___/SNF___/Other________/Unknown at this time___x_____

## 2021-05-18 ENCOUNTER — TRANSCRIPTION ENCOUNTER (OUTPATIENT)
Age: 76
End: 2021-05-18

## 2021-05-18 VITALS
TEMPERATURE: 97 F | SYSTOLIC BLOOD PRESSURE: 128 MMHG | DIASTOLIC BLOOD PRESSURE: 62 MMHG | RESPIRATION RATE: 18 BRPM | HEART RATE: 55 BPM

## 2021-05-18 LAB
ALBUMIN SERPL ELPH-MCNC: 3.8 G/DL — SIGNIFICANT CHANGE UP (ref 3.5–5.2)
ALP SERPL-CCNC: 85 U/L — SIGNIFICANT CHANGE UP (ref 30–115)
ALT FLD-CCNC: 11 U/L — SIGNIFICANT CHANGE UP (ref 0–41)
ANION GAP SERPL CALC-SCNC: 17 MMOL/L — HIGH (ref 7–14)
AST SERPL-CCNC: 12 U/L — SIGNIFICANT CHANGE UP (ref 0–41)
BILIRUB SERPL-MCNC: 0.7 MG/DL — SIGNIFICANT CHANGE UP (ref 0.2–1.2)
BUN SERPL-MCNC: 22 MG/DL — HIGH (ref 10–20)
CALCIUM SERPL-MCNC: 9.1 MG/DL — SIGNIFICANT CHANGE UP (ref 8.5–10.1)
CHLORIDE SERPL-SCNC: 101 MMOL/L — SIGNIFICANT CHANGE UP (ref 98–110)
CO2 SERPL-SCNC: 21 MMOL/L — SIGNIFICANT CHANGE UP (ref 17–32)
CREAT SERPL-MCNC: 1.1 MG/DL — SIGNIFICANT CHANGE UP (ref 0.7–1.5)
GLUCOSE SERPL-MCNC: 91 MG/DL — SIGNIFICANT CHANGE UP (ref 70–99)
HCT VFR BLD CALC: 44.3 % — SIGNIFICANT CHANGE UP (ref 37–47)
HGB BLD-MCNC: 14.7 G/DL — SIGNIFICANT CHANGE UP (ref 12–16)
MAGNESIUM SERPL-MCNC: 2 MG/DL — SIGNIFICANT CHANGE UP (ref 1.8–2.4)
MCHC RBC-ENTMCNC: 28.3 PG — SIGNIFICANT CHANGE UP (ref 27–31)
MCHC RBC-ENTMCNC: 33.2 G/DL — SIGNIFICANT CHANGE UP (ref 32–37)
MCV RBC AUTO: 85.2 FL — SIGNIFICANT CHANGE UP (ref 81–99)
NRBC # BLD: 0 /100 WBCS — SIGNIFICANT CHANGE UP (ref 0–0)
PLATELET # BLD AUTO: 305 K/UL — SIGNIFICANT CHANGE UP (ref 130–400)
POTASSIUM SERPL-MCNC: 3.1 MMOL/L — LOW (ref 3.5–5)
POTASSIUM SERPL-SCNC: 3.1 MMOL/L — LOW (ref 3.5–5)
PROT SERPL-MCNC: 6.1 G/DL — SIGNIFICANT CHANGE UP (ref 6–8)
RBC # BLD: 5.2 M/UL — SIGNIFICANT CHANGE UP (ref 4.2–5.4)
RBC # FLD: 12.8 % — SIGNIFICANT CHANGE UP (ref 11.5–14.5)
SODIUM SERPL-SCNC: 139 MMOL/L — SIGNIFICANT CHANGE UP (ref 135–146)
WBC # BLD: 8.76 K/UL — SIGNIFICANT CHANGE UP (ref 4.8–10.8)
WBC # FLD AUTO: 8.76 K/UL — SIGNIFICANT CHANGE UP (ref 4.8–10.8)

## 2021-05-18 PROCEDURE — 99239 HOSP IP/OBS DSCHRG MGMT >30: CPT

## 2021-05-18 RX ORDER — FUROSEMIDE 40 MG
1 TABLET ORAL
Qty: 30 | Refills: 0
Start: 2021-05-18 | End: 2021-06-16

## 2021-05-18 RX ORDER — AMIODARONE HYDROCHLORIDE 400 MG/1
1 TABLET ORAL
Qty: 30 | Refills: 0
Start: 2021-05-18 | End: 2021-06-16

## 2021-05-18 RX ORDER — FUROSEMIDE 40 MG
20 TABLET ORAL DAILY
Refills: 0 | Status: DISCONTINUED | OUTPATIENT
Start: 2021-05-18 | End: 2021-05-18

## 2021-05-18 RX ORDER — METOPROLOL TARTRATE 50 MG
1 TABLET ORAL
Qty: 0 | Refills: 0 | DISCHARGE

## 2021-05-18 RX ORDER — POTASSIUM CHLORIDE 20 MEQ
40 PACKET (EA) ORAL ONCE
Refills: 0 | Status: DISCONTINUED | OUTPATIENT
Start: 2021-05-18 | End: 2021-05-18

## 2021-05-18 RX ORDER — RIVAROXABAN 15 MG-20MG
1 KIT ORAL
Qty: 30 | Refills: 0
Start: 2021-05-18 | End: 2021-06-16

## 2021-05-18 RX ORDER — METOPROLOL TARTRATE 50 MG
1 TABLET ORAL
Qty: 60 | Refills: 0
Start: 2021-05-18 | End: 2021-06-16

## 2021-05-18 RX ORDER — AMIODARONE HYDROCHLORIDE 400 MG/1
400 TABLET ORAL
Refills: 0 | Status: DISCONTINUED | OUTPATIENT
Start: 2021-05-18 | End: 2021-05-18

## 2021-05-18 RX ORDER — METOPROLOL TARTRATE 50 MG
25 TABLET ORAL
Refills: 0 | Status: DISCONTINUED | OUTPATIENT
Start: 2021-05-18 | End: 2021-05-18

## 2021-05-18 RX ORDER — AMIODARONE HYDROCHLORIDE 400 MG/1
2 TABLET ORAL
Qty: 16 | Refills: 0
Start: 2021-05-18 | End: 2021-05-21

## 2021-05-18 RX ADMIN — Medication 40 MILLIGRAM(S): at 05:55

## 2021-05-18 NOTE — DISCHARGE NOTE PROVIDER - NSDCMRMEDTOKEN_GEN_ALL_CORE_FT
amiodarone 200 mg oral tablet: 2 tab(s) orally 2 times a day  amiodarone 200 mg oral tablet: 1 tab(s) orally once a day   furosemide 20 mg oral tablet: 1 tab(s) orally once a day  metoprolol tartrate 25 mg oral tablet: 1 tab(s) orally 2 times a day  Xarelto 20 mg oral tablet: 1 tab(s) orally once a day (at bedtime)

## 2021-05-18 NOTE — DISCHARGE NOTE PROVIDER - NSDCFUSCHEDAPPT_GEN_ALL_CORE_FT
MAISHA VELASQUEZ ; 06/16/2021 ; [facility] [address] ; 08/02/2021 ; [facility] [address]

## 2021-05-18 NOTE — DISCHARGE NOTE PROVIDER - PROVIDER TOKENS
PROVIDER:[TOKEN:[[id number]]],PROVIDER:[TOKEN:[[id number]]],PROVIDER:[TOKEN:[[id number]]]

## 2021-05-18 NOTE — PROGRESS NOTE ADULT - ASSESSMENT
76F PMHx HFrEF, AFib on xarelto s/p cardioversion here with acute hypoxic resp failure.    #Acute hypoxic resp failure, suspect due to volume overload  chronic bl opacities/ effusion on cxr  hypoxia resolved, satting well on ra  repeat cxr with improvement  to d/c on lasix 20 po  stable for d/c, needs outpt pmd, cards f/u one week  #AFib, persistent despite cardioversion 5/12  s/p cardioversion; now in nsr  amio load per ep; plan for 5g load  may need ablation outpt  lopressor 100 bid  xarelto  #HFrEF of 35%  lasix as above  outpt f/u for entresto  #DVT ppx  xarelto

## 2021-05-18 NOTE — PROGRESS NOTE ADULT - SUBJECTIVE AND OBJECTIVE BOX
INTERVAL HPI/OVERNIGHT EVENTS:    SUBJECTIVE: Patient seen and examined at bedside.     no cp, sob, abd pain, fever  no sob, orthopnea, pnd, cough    OBJECTIVE:    VITAL SIGNS:  Vital Signs Last 24 Hrs  T(C): 35.7 (18 May 2021 08:00), Max: 36.8 (17 May 2021 15:00)  T(F): 96.2 (18 May 2021 08:00), Max: 98.2 (17 May 2021 15:00)  HR: 53 (18 May 2021 08:00) (48 - 89)  BP: 126/59 (18 May 2021 08:00) (103/56 - 128/82)  BP(mean): --  RR: 18 (18 May 2021 08:00) (18 - 18)  SpO2: 97% (17 May 2021 22:50) (94% - 97%)      PHYSICAL EXAM:    General: NAD  HEENT: NC/AT; PERRL, clear conjunctiva  Neck: supple  Respiratory: sl base crackles  Cardiovascular: +S1/S2; RRR  Abdomen: soft, NT/ND; +BS x4  Extremities: WWP, 2+ peripheral pulses b/l; no LE edema  Skin: normal color and turgor; no rash  Neurological:    MEDICATIONS:  MEDICATIONS  (STANDING):  aMIOdarone    Tablet 400 milliGRAM(s) Oral two times a day  aMIOdarone Infusion 1 mG/Min (33.3 mL/Hr) IV Continuous <Continuous>  aMIOdarone Infusion 0.5 mG/Min (16.7 mL/Hr) IV Continuous <Continuous>  chlorhexidine 4% Liquid 1 Application(s) Topical <User Schedule>  furosemide    Tablet 20 milliGRAM(s) Oral daily  metoprolol tartrate 25 milliGRAM(s) Oral two times a day  potassium chloride    Tablet ER 40 milliEquivalent(s) Oral once  rivaroxaban 20 milliGRAM(s) Oral with dinner    MEDICATIONS  (PRN):  acetaminophen    Suspension .. 650 milliGRAM(s) Oral every 6 hours PRN Moderate Pain (4 - 6)      ALLERGIES:  Allergies    No Known Allergies    Intolerances        LABS:                        14.7   8.76  )-----------( 305      ( 18 May 2021 07:31 )             44.3     Hemoglobin: 14.7 g/dL (05-18 @ 07:31)  Hemoglobin: 15.9 g/dL (05-15 @ 09:04)  Hemoglobin: 14.7 g/dL (05-14 @ 00:00)    CBC Full  -  ( 18 May 2021 07:31 )  WBC Count : 8.76 K/uL  RBC Count : 5.20 M/uL  Hemoglobin : 14.7 g/dL  Hematocrit : 44.3 %  Platelet Count - Automated : 305 K/uL  Mean Cell Volume : 85.2 fL  Mean Cell Hemoglobin : 28.3 pg  Mean Cell Hemoglobin Concentration : 33.2 g/dL  Auto Neutrophil # : x  Auto Lymphocyte # : x  Auto Monocyte # : x  Auto Eosinophil # : x  Auto Basophil # : x  Auto Neutrophil % : x  Auto Lymphocyte % : x  Auto Monocyte % : x  Auto Eosinophil % : x  Auto Basophil % : x    05-18    139  |  101  |  22<H>  ----------------------------<  91  3.1<L>   |  21  |  1.1    Ca    9.1      18 May 2021 07:31  Mg     2.0     05-18    TPro  6.1  /  Alb  3.8  /  TBili  0.7  /  DBili  x   /  AST  12  /  ALT  11  /  AlkPhos  85  05-18    Creatinine Trend: 1.1<--, 0.9<--, 0.9<--, 1.1<--, 1.0<--  LIVER FUNCTIONS - ( 18 May 2021 07:31 )  Alb: 3.8 g/dL / Pro: 6.1 g/dL / ALK PHOS: 85 U/L / ALT: 11 U/L / AST: 12 U/L / GGT: x               hs Troponin:              CSF:                      EKG:   MICROBIOLOGY:    IMAGING:      Labs, imaging, EKG personally reviewed    RADIOLOGY & ADDITIONAL TESTS: Reviewed.

## 2021-05-18 NOTE — DISCHARGE NOTE PROVIDER - HOSPITAL COURSE
76 years old female w/ Pmhx of HFrEF, Afib (on xarelto) s/p LEEANN/DCCV by Dr. Bergman 5/12 successfully yesterday came in with shortness of breath.    #Atrial Fibrillation   - s/p cardioversion 5/12  - lopressor decreased to 50mg BID  - Started on amnio drip  - c/p repeat cardioversion today   - f/u cardio and EP   - Monitor electrolytes, Keep K >4, Mag >2   - f/u EP OP 2-3 weeks with Dr. Vines   - c/w tele monitoring     #Acute hypoxic respiratory failure, 2/2 to volume overload  #Chronic b/l opacities effusions o nCXR   #HFrEF   - hypoxia resolved, currently on room air   - lasix 40mg PO  - ECHO: EF 35%  - s/p rocephin, abx on hold due to low suspicion of PNA   - procal 0.25, monitor off abx   - repeat cxr in am   - OP f/u to start entresto     # Diet: DASH/TLC  # DVT Prophylaxis: xarelto  # GI Prophylaxis: protonix  # Activity: IAT  # Dispo: Acute  # Code Status Fullcode: 76 years old female w/ Pmhx of HFrEF, Afib (on xarelto) s/p LEEANN/DCCV by Dr. Bergman 5/12 successfully yesterday came in with shortness of breath.    #Atrial Fibrillation   - s/p cardioversion 5/12  - lopressor decreased to 50mg BID  - Started on amnio drip  - c/p repeat cardioversion today   - f/u cardio and EP   - Monitor electrolytes, Keep K >4, Mag >2   - f/u EP OP 2-3 weeks with Dr. Vines   - c/w tele monitoring     #Acute hypoxic respiratory failure, 2/2 to volume overload  #Chronic b/l opacities effusions o nCXR   #HFrEF   - hypoxia resolved, currently on room air   - lasix 40mg PO  - ECHO: EF 35%  - s/p rocephin, abx on hold due to low suspicion of PNA   - procal 0.25, monitor off abx   - repeat cxr in am   - OP f/u to start entresto     # Diet: DASH/TLC  # DVT Prophylaxis: xarelto  # GI Prophylaxis: protonix  # Activity: IAT  # Dispo: Acute  # Code Status Fullcode:    41 minutes spent on discharge planning.

## 2021-05-18 NOTE — PROGRESS NOTE ADULT - PROVIDER SPECIALTY LIST ADULT
Electrophysiology
Internal Medicine
Internal Medicine
Electrophysiology
Internal Medicine

## 2021-05-18 NOTE — DISCHARGE NOTE NURSING/CASE MANAGEMENT/SOCIAL WORK - PATIENT PORTAL LINK FT
You can access the FollowMyHealth Patient Portal offered by [facility] by registering at the following website: [email or web address]. By joining [employer]’s FollowMyHealth portal, you will also be able to view your health information using other applications (apps) compatible with our system.

## 2021-05-18 NOTE — DISCHARGE NOTE PROVIDER - CARE PROVIDERS DIRECT ADDRESSES
[email or web address],DirectAddress_Unknown,DirectAddress_Unknown

## 2021-05-18 NOTE — DISCHARGE NOTE PROVIDER - NSDCCPCAREPLAN_GEN_ALL_CORE_FT
PRINCIPAL DISCHARGE DIAGNOSIS  Diagnosis: Atrial fibrillation  Assessment and Plan of Treatment: You were admitted for atrial fibrillation. We treated you with medication as well as cardioversion by our electrophysiology team. Please take your new medications as persribed. Please follow up with your electrophysiologist within 2-3 weeks. Please follow up with your primary care doctor within 1 week. Please follow up with your cardiologist.      SECONDARY DISCHARGE DIAGNOSES  Diagnosis: Dyspnea  Assessment and Plan of Treatment: You were admitted for shortness of breath, due to heart failure exacerbation. We started you on a water pill, please take new medication as perscribed. Please follow up with your primary care doctor within one week. Please follow up with your cardiologist.    Diagnosis: Acute respiratory failure with hypoxia  Assessment and Plan of Treatment: You were admitted with hypoxic respiratory failure due to heart failure exacerbation. Please take medications as perscribed. Please follow up with your cardiologist.

## 2021-05-18 NOTE — DISCHARGE NOTE PROVIDER - CARE PROVIDER_API CALL
Vitaly Reyes (MD)  Cardiovascular Disease; Internal Medicine; Interventional Cardiology  [address] [place] [address]  Phone: [phone]  Fax: [phone]  Follow Up Time:     Rock Vines)  Cardiac Electrophysiology; Cardiology; Internal Medicine  [address] [place] [address]  Phone: [phone]  Fax: [phone]  Follow Up Time:     Grisel Seaman)  Medicine  Critical Care  [address] [place] [address]  Phone: [phone]  Fax: [phone]  Follow Up Time:

## 2021-05-19 LAB — TSH SERPL-MCNC: 1.49 UIU/ML — SIGNIFICANT CHANGE UP (ref 0.27–4.2)

## 2021-05-20 LAB
CULTURE RESULTS: SIGNIFICANT CHANGE UP
SPECIMEN SOURCE: SIGNIFICANT CHANGE UP

## 2021-05-24 DIAGNOSIS — I50.22 CHRONIC SYSTOLIC (CONGESTIVE) HEART FAILURE: ICD-10-CM

## 2021-05-24 DIAGNOSIS — I34.0 NONRHEUMATIC MITRAL (VALVE) INSUFFICIENCY: ICD-10-CM

## 2021-05-24 DIAGNOSIS — I48.19 OTHER PERSISTENT ATRIAL FIBRILLATION: ICD-10-CM

## 2021-05-24 DIAGNOSIS — J96.01 ACUTE RESPIRATORY FAILURE WITH HYPOXIA: ICD-10-CM

## 2021-05-24 DIAGNOSIS — Z79.01 LONG TERM (CURRENT) USE OF ANTICOAGULANTS: ICD-10-CM

## 2021-05-24 DIAGNOSIS — Z87.891 PERSONAL HISTORY OF NICOTINE DEPENDENCE: ICD-10-CM

## 2021-06-07 ENCOUNTER — APPOINTMENT (OUTPATIENT)
Dept: CARDIOLOGY | Facility: CLINIC | Age: 76
End: 2021-06-07
Payer: MEDICARE

## 2021-06-07 PROCEDURE — 99072 ADDL SUPL MATRL&STAF TM PHE: CPT

## 2021-06-07 PROCEDURE — 93228 REMOTE 30 DAY ECG REV/REPORT: CPT

## 2021-06-12 NOTE — ED ADULT TRIAGE NOTE - TEMPERATURE IN FAHRENHEIT (DEGREES F)
Patient is resting comfortably in bed. Bed in lowest, rails engaged, call light 
on lap.

Vital Signs within normal limits. WCTM. SIITER OUTSIDE ROOM FOR MONITORS.

BREATHING EVEN AND UNLABORED. NADN. GARAGE DOORS SECURED. 97.6

## 2021-06-15 ENCOUNTER — APPOINTMENT (OUTPATIENT)
Dept: CARDIOLOGY | Facility: CLINIC | Age: 76
End: 2021-06-15
Payer: MEDICARE

## 2021-06-15 VITALS
TEMPERATURE: 97 F | WEIGHT: 166 LBS | HEART RATE: 49 BPM | BODY MASS INDEX: 26.68 KG/M2 | HEIGHT: 66 IN | DIASTOLIC BLOOD PRESSURE: 68 MMHG | SYSTOLIC BLOOD PRESSURE: 118 MMHG

## 2021-06-15 PROCEDURE — 99072 ADDL SUPL MATRL&STAF TM PHE: CPT

## 2021-06-15 PROCEDURE — 93000 ELECTROCARDIOGRAM COMPLETE: CPT

## 2021-06-15 PROCEDURE — 99214 OFFICE O/P EST MOD 30 MIN: CPT

## 2021-06-15 RX ORDER — DILTIAZEM HYDROCHLORIDE 120 MG/1
120 CAPSULE, COATED, EXTENDED RELEASE ORAL
Refills: 0 | Status: DISCONTINUED | COMMUNITY
End: 2021-06-15

## 2021-06-15 RX ORDER — METOPROLOL TARTRATE 100 MG/1
100 TABLET, FILM COATED ORAL
Refills: 0 | Status: DISCONTINUED | COMMUNITY
End: 2021-06-15

## 2021-06-15 NOTE — REASON FOR VISIT
[Arrhythmia/ECG Abnorrmalities] : arrhythmia/ECG abnormalities [Structural Heart and Valve Disease] : structural heart and valve disease [Hypertension] : hypertension [FreeTextEntry3] : Snow

## 2021-06-16 ENCOUNTER — APPOINTMENT (OUTPATIENT)
Dept: CARDIOLOGY | Facility: CLINIC | Age: 76
End: 2021-06-16
Payer: MEDICARE

## 2021-06-16 VITALS
HEART RATE: 48 BPM | BODY MASS INDEX: 26.68 KG/M2 | DIASTOLIC BLOOD PRESSURE: 72 MMHG | TEMPERATURE: 97.5 F | OXYGEN SATURATION: 94 % | SYSTOLIC BLOOD PRESSURE: 132 MMHG | HEIGHT: 66 IN | WEIGHT: 166 LBS

## 2021-06-16 DIAGNOSIS — Z00.00 ENCOUNTER FOR GENERAL ADULT MEDICAL EXAMINATION W/OUT ABNORMAL FINDINGS: ICD-10-CM

## 2021-06-16 PROCEDURE — 99072 ADDL SUPL MATRL&STAF TM PHE: CPT

## 2021-06-16 PROCEDURE — 93000 ELECTROCARDIOGRAM COMPLETE: CPT

## 2021-06-16 PROCEDURE — 99214 OFFICE O/P EST MOD 30 MIN: CPT

## 2021-06-16 NOTE — ASSESSMENT
[FreeTextEntry1] : ## persistent atrial fibrillation s/p DCCV (x2)\par ## HFrEF\par ## Moderate to severe MR\par \par - Feels fatigue (more than SOB) likely due to bradycardia. We will reduce metoprolol to 12.5 mg q12 for now\par - Continue Amiodarone\par - Euvolemic today. Reduce Lasix to 40 mg q48hr\par - Continue with Xarelto\par - TTE in 2 weeks to assess LVEF/MR\par - If severe MR --> surgical referral for MVR + ANASTASIA clip +/- Maze; if MR < severe, she will benefit from ablation due to systolic dysfunction and along with persistent atrial fibrillation  \par - Return in 2 weeks

## 2021-06-16 NOTE — HISTORY OF PRESENT ILLNESS
[FreeTextEntry1] : I had a pleasure of seeing Ms. VELASQUEZ for follow-up consultation for Atrial Fibrillation. \par \par Ms. VELASQUEZ is a 76 year-year old female with history of persistent atrial fibrillation s/p DCCV (x2 in 05/21), moderate to severe MR during AF, HFrEF is here for f-up.\par Feels fatigue.\par Denies chest pain, shortness of breath, palpitation, dizziness or LOC except noted above.\par \par \par EKG: SR@ 48/min,  ms, QRS 88 ms\par TTE (06/16/21): EF 35-40%, mod MR/LAE\par Cardio: Dr. Hairston

## 2021-06-23 ENCOUNTER — NON-APPOINTMENT (OUTPATIENT)
Age: 76
End: 2021-06-23

## 2021-06-24 ENCOUNTER — NON-APPOINTMENT (OUTPATIENT)
Age: 76
End: 2021-06-24

## 2021-06-28 ENCOUNTER — APPOINTMENT (OUTPATIENT)
Dept: CARDIOLOGY | Facility: CLINIC | Age: 76
End: 2021-06-28
Payer: MEDICARE

## 2021-06-28 PROCEDURE — 93306 TTE W/DOPPLER COMPLETE: CPT

## 2021-06-28 PROCEDURE — 99072 ADDL SUPL MATRL&STAF TM PHE: CPT

## 2021-06-30 ENCOUNTER — APPOINTMENT (OUTPATIENT)
Dept: CARDIOLOGY | Facility: CLINIC | Age: 76
End: 2021-06-30
Payer: MEDICARE

## 2021-06-30 VITALS
TEMPERATURE: 97.3 F | BODY MASS INDEX: 25.71 KG/M2 | DIASTOLIC BLOOD PRESSURE: 80 MMHG | HEART RATE: 59 BPM | SYSTOLIC BLOOD PRESSURE: 150 MMHG | HEIGHT: 66 IN | WEIGHT: 160 LBS | OXYGEN SATURATION: 96 %

## 2021-06-30 PROCEDURE — 93000 ELECTROCARDIOGRAM COMPLETE: CPT

## 2021-06-30 PROCEDURE — 99214 OFFICE O/P EST MOD 30 MIN: CPT

## 2021-06-30 PROCEDURE — 99072 ADDL SUPL MATRL&STAF TM PHE: CPT

## 2021-07-01 ENCOUNTER — NON-APPOINTMENT (OUTPATIENT)
Age: 76
End: 2021-07-01

## 2021-07-01 ENCOUNTER — APPOINTMENT (OUTPATIENT)
Dept: CARDIOTHORACIC SURGERY | Facility: CLINIC | Age: 76
End: 2021-07-01
Payer: MEDICARE

## 2021-07-01 VITALS
BODY MASS INDEX: 22.22 KG/M2 | TEMPERATURE: 98.3 F | RESPIRATION RATE: 12 BRPM | OXYGEN SATURATION: 92 % | WEIGHT: 150 LBS | HEIGHT: 69 IN | SYSTOLIC BLOOD PRESSURE: 160 MMHG | HEART RATE: 57 BPM | DIASTOLIC BLOOD PRESSURE: 76 MMHG

## 2021-07-01 DIAGNOSIS — Z82.3 FAMILY HISTORY OF STROKE: ICD-10-CM

## 2021-07-01 DIAGNOSIS — Z82.49 FAMILY HISTORY OF ISCHEMIC HEART DISEASE AND OTHER DISEASES OF THE CIRCULATORY SYSTEM: ICD-10-CM

## 2021-07-01 PROCEDURE — 99072 ADDL SUPL MATRL&STAF TM PHE: CPT

## 2021-07-01 PROCEDURE — 99213 OFFICE O/P EST LOW 20 MIN: CPT

## 2021-07-01 NOTE — HISTORY OF PRESENT ILLNESS
[FreeTextEntry1] : Ms. Dulce Maria Gerard is a 76 years old female w/ PMH of HFrEF, Afib (on xarelto) s/p LEEANN/DCCV. Pt was seen by her PMD for LE edema and referred to Cardio, Dr Hairston, Further work up and LEEANN revealed Moderate to severe mitral valve regurgitation. Follow up echo revealed an improvement in her EF, and mild to moderate MR. Patient denies any symptoms. Arrives today for further discussion of her mitral regurgitation. \par \par PMD: \par Cardio: Marika\par EPS: Jasmyn \par \par

## 2021-07-01 NOTE — PHYSICAL EXAM
[General Appearance - Alert] : alert [General Appearance - In No Acute Distress] : in no acute distress [Sclera] : the sclera and conjunctiva were normal [PERRL With Normal Accommodation] : pupils were equal in size, round, and reactive to light [Extraocular Movements] : extraocular movements were intact [Auscultation Breath Sounds / Voice Sounds] : lungs were clear to auscultation bilaterally [Normal Rate] : normal [III] : a grade 3 [Bowel Sounds] : normal bowel sounds [Abdomen Soft] : soft [Abdomen Tenderness] : non-tender [Abdomen Mass (___ Cm)] : no abdominal mass palpated [Abnormal Walk] : normal gait [Nail Clubbing] : no clubbing  or cyanosis of the fingernails [Musculoskeletal - Swelling] : no joint swelling seen [Motor Tone] : muscle strength and tone were normal [Skin Color & Pigmentation] : normal skin color and pigmentation [Skin Turgor] : normal skin turgor [] : no rash [Deep Tendon Reflexes (DTR)] : deep tendon reflexes were 2+ and symmetric [Sensation] : the sensory exam was normal to light touch and pinprick [No Focal Deficits] : no focal deficits [Oriented To Time, Place, And Person] : oriented to person, place, and time [Impaired Insight] : insight and judgment were intact [Affect] : the affect was normal

## 2021-07-01 NOTE — ASSESSMENT
[FreeTextEntry1] : Ms. Dulce Maria Gerard is a 76 years old female w/ PMH of HFrEF, Afib (on xarelto) s/p LEEANN/DCCV. Pt was seen by her PMD for LE edema and referred to Cardio, Dr Hairston, Further work up and LEEANN revealed Moderate to severe mitral valve regurgitation. Follow up echo revealed an improvement in her EF, and mild to moderate MR. Patient denies any symptoms. Arrives today for further discussion of her mitral regurgitation. Currently the patient is being seen by Dr. Vines for possible ablation. Follow up with Cardio for medical management of her MR. No surgical intervention. \par \par JOHNNIE De La Cruz, [facility] am acting as the scribe for Dr. Glass\par \par  no symptoms from MR. treat medically for now.\par

## 2021-07-01 NOTE — DATA REVIEWED
[FreeTextEntry1] : EXAM:  LEEANN:W PROBE PLACMT-GLOBAL  \par PROCEDURE DATE:  05/12/2021  \par INTERPRETATION:  REPORT:\par TRANSESOPHAGEAL ECHOCARDIOGRAM REPORT\par \par Procedure:     Transesophageal Echocardiogram.\par Indications:   Unspecified atrial fibrillation - I48.91\par Diagnosis:     Unspecified atrial fibrillation - I48.91\par Study Details: Technically good study.\par \par \par \par Summary:\par  1. LV Ejection Fraction by Anne's Method with a biplane EF of 32 %.\par  2. Moderately decreased global left ventricular systolic function.\par  3. Moderate thickening and calcification of the anterior and posterior mitral valve leaflets.\par  4. Moderate to severe mitral valve regurgitation.\par  5. Mild-moderate tricuspid regurgitation.\par  6. Mild aortic regurgitation.\par  7. Color flow doppler and intravenous injection of agitated saline demonstrates the presence of an intact intra atrial septum.\par  8. Right atrial enlargement.\par  9. Left atrial enlargement.\par 10. No left atrial appendage thrombus and decreased left atrial appendage velocities.\par 11. Atrial fibrillation with successful cardioversion to normal sinus rhythm with synchronized cardioverision 200 J x 1 via AP pads.\par \par \par PROCEDURE: After discussion of the risks and benefits of the LEEANN, an informed consent was obtained by the cardiologist. Intravenous sedation was performed by anesthesia. The LEEANN probe was passed by the cardiologist without difficulty. Images were obtained with the patient in a left lateral decubitus position. Image quality was good. The patient tolerated the procedure well and without complications.\par \par PHYSICIAN INTERPRETATION:\par Left Ventricle: Global LV systolic function was moderately decreased.\par Right Ventricle: Normal right ventricular size and function.\par Left Atrium: Left atrial enlargement. No left atrial appendage thrombus is seen and decreased left atrial appendage velocities. Color flow doppler and intravenous injection of agitated saline demonstrates the presence of an intact intra atrial septum.\par Right Atrium: Right atrial enlargement. Prominent Chiari network.\par Mitral Valve: Moderate thickening and calcification of the anterior and posterior mitral valve leaflets. No evidence of mitral stenosis. Moderate to severe mitral valve regurgitation is seen. The MR jet is centrally-directed.\par Tricuspid Valve: Mild-moderate tricuspid regurgitation is visualized.\par Aortic Valve: The aortic valve is trileaflet. No evidence of aortic stenosis. Mild aortic valve regurgitation is seen.\par Pulmonic Valve: The pulmonic valve is normal. No indication of pulmonic valve regurgitation.\par Aorta: The aortic root, ascending aorta and aortic arch are all structurally normal, with no evidence of dilitation or obstruction. Simple atheroma seen in the aortic arch.\par Shunts: Agitated saline contrast was given intravenously to evaluate for intracardiac shunting. There is no evidence of a patent foramen ovale.\par SPECTRAL DOPPLER ANALYSIS:\par Mitral Insufficiency by PISA:\par MR Volume: 47.14 ml MR Flow Rate: 136.51 ml/s MR EROA: 24.98 mm²\par

## 2021-07-13 ENCOUNTER — APPOINTMENT (OUTPATIENT)
Dept: CARDIOLOGY | Facility: CLINIC | Age: 76
End: 2021-07-13
Payer: MEDICARE

## 2021-07-13 VITALS
WEIGHT: 170 LBS | SYSTOLIC BLOOD PRESSURE: 177 MMHG | HEART RATE: 46 BPM | HEIGHT: 69 IN | BODY MASS INDEX: 25.18 KG/M2 | DIASTOLIC BLOOD PRESSURE: 81 MMHG | TEMPERATURE: 96 F

## 2021-07-13 PROCEDURE — 99214 OFFICE O/P EST MOD 30 MIN: CPT

## 2021-07-13 PROCEDURE — 99072 ADDL SUPL MATRL&STAF TM PHE: CPT

## 2021-07-13 PROCEDURE — 93000 ELECTROCARDIOGRAM COMPLETE: CPT

## 2021-07-13 NOTE — CARDIOLOGY SUMMARY
[___] : [unfilled] [de-identified] : 6- Sinus bradycardia NS T wave change.  [de-identified] : 6- EF 59% mild to mod MR trace TR RVSP was 30 mmhg mild dilitation of the Sinus of Valsalva

## 2021-07-13 NOTE — HISTORY OF PRESENT ILLNESS
[FreeTextEntry1] : The patient had repeat echo after being in NSR and was found to have normal LV systolic function and mild to moderate MR . HEr exercise tolerance is acceptable . She has had no chest pain . She was seen by Dr. terrazas . She will be followed over time with her MR . She has a planned AF Ablation PVI

## 2021-07-13 NOTE — ASSESSMENT
[FreeTextEntry1] : The patient has been n NSR/Sinus Bradycardia and feels much better . She is going for AF ablation PVI . Her BP is increased and her HR is low . The patient has improvement in her BP and degree of MR after maintaining NSR .

## 2021-07-13 NOTE — REASON FOR VISIT
[Arrhythmia/ECG Abnorrmalities] : arrhythmia/ECG abnormalities [Structural Heart and Valve Disease] : structural heart and valve disease [Hypertension] : hypertension [Consultation] : a consultation regarding [Atrial Fibrillation] : atrial fibrillation [FreeTextEntry3] : Snow

## 2021-07-22 NOTE — HISTORY OF PRESENT ILLNESS
[FreeTextEntry1] : I had a pleasure of seeing Ms. VELASQUEZ for follow-up consultation for Atrial Fibrillation. \par \par Ms. VELASQUEZ is a 76 year-year old female with history of persistent atrial fibrillation s/p DCCV (x2 in 05/21), moderate to severe MR during AF, HFrEF is here for f-up.\par Feels fatigue.\par Denies chest pain, shortness of breath, palpitation, dizziness or LOC except noted above.\par \par 6/30: Feels better. Remains in sinus. Echo in sinus shows mild to mod MR.\par \par EKG (06/30): SR@59/min, RI: 172 ms, QRSd 90 ms\par EKG: SR@ 48/min,  ms, QRS 88 ms\par TTE (06/16/21): EF 35-40%, mod MR/LAE\par Cardio: Dr. Hairston

## 2021-07-22 NOTE — HISTORY OF PRESENT ILLNESS
[FreeTextEntry1] : I had a pleasure of seeing Ms. VELASQUEZ for follow-up consultation for Atrial Fibrillation. \par \par Ms. VELASQUEZ is a 76 year-year old female with history of persistent atrial fibrillation s/p DCCV (x2 in 05/21), moderate to severe MR during AF, HFrEF is here for f-up.\par Feels fatigue.\par Denies chest pain, shortness of breath, palpitation, dizziness or LOC except noted above.\par \par 6/30: Feels better. Remains in sinus. Echo in sinus shows mild to mod MR.\par \par EKG (06/30): SR@59/min, WV: 172 ms, QRSd 90 ms\par EKG: SR@ 48/min,  ms, QRS 88 ms\par TTE (06/16/21): EF 35-40%, mod MR/LAE\par Cardio: Dr. Hairston

## 2021-07-22 NOTE — ASSESSMENT
[FreeTextEntry1] : ## persistent atrial fibrillation s/p DCCV (x2)\par ## HFrEF\par ## Mitral Regurgitation\par \par - Feels better. Remains in euvolemic.\par - Repeat echo showed mild to mod MR\par - In view of AF/RVR and Nonischemic cardiomyopathy, we discussed management options including ablation vs AAD. Pros-cons discussed.\par We discussed multiple therapeutic options for the treatment of atrial fibrillation, including undergoing an atrial fibrillation/left atrial antral isolation ablation. The details of the procedure and risks associated with undergoing an atrial fibrillation/ANASTASIA ablation were discussed in detail including, but not limited to, death, myocardial ischemia, stroke, cardiac perforation, pulmonary vein stenosis, diaphragmatic paralysis via phrenic nerve injury, catheter entrapment in the mitral valve or other location, bleeding, infection, deep vein thrombosis, vascular injury, and worsening atrial arrhythmias. We also discussed that there is a low risk of possible esophageal ulceration, atrial esophageal fistula, atrial bronchial fistula, or another complication requiring the need for major surgery to address.\par \par We also discussed that recurrent atrial fibrillation in the first 2-3 months post procedure is a part of the healing process and has no impact on the overall longer- term success of the ablation. To try to reduce the incidence of these events, the plan will be for antiarrhythmic therapy to be restarted post procedure and continued for the first 3 months after ablation. \par - Continue amio, metoprolol and Xarelto for now.\par - We will plan to DC amiodarone after ablation

## 2021-07-23 ENCOUNTER — OUTPATIENT (OUTPATIENT)
Dept: OUTPATIENT SERVICES | Facility: HOSPITAL | Age: 76
LOS: 1 days | Discharge: HOME | End: 2021-07-23
Payer: MEDICARE

## 2021-07-23 ENCOUNTER — RESULT REVIEW (OUTPATIENT)
Age: 76
End: 2021-07-23

## 2021-07-23 VITALS
WEIGHT: 166.01 LBS | HEART RATE: 55 BPM | OXYGEN SATURATION: 98 % | RESPIRATION RATE: 16 BRPM | SYSTOLIC BLOOD PRESSURE: 179 MMHG | DIASTOLIC BLOOD PRESSURE: 77 MMHG | HEIGHT: 66 IN | TEMPERATURE: 98 F

## 2021-07-23 DIAGNOSIS — I48.0 PAROXYSMAL ATRIAL FIBRILLATION: ICD-10-CM

## 2021-07-23 DIAGNOSIS — Z98.890 OTHER SPECIFIED POSTPROCEDURAL STATES: Chronic | ICD-10-CM

## 2021-07-23 DIAGNOSIS — Z01.818 ENCOUNTER FOR OTHER PREPROCEDURAL EXAMINATION: ICD-10-CM

## 2021-07-23 DIAGNOSIS — M51.26 OTHER INTERVERTEBRAL DISC DISPLACEMENT, LUMBAR REGION: Chronic | ICD-10-CM

## 2021-07-23 LAB
ALBUMIN SERPL ELPH-MCNC: 4.7 G/DL — SIGNIFICANT CHANGE UP (ref 3.5–5.2)
ALP SERPL-CCNC: 87 U/L — SIGNIFICANT CHANGE UP (ref 30–115)
ALT FLD-CCNC: 8 U/L — SIGNIFICANT CHANGE UP (ref 0–41)
ANION GAP SERPL CALC-SCNC: 11 MMOL/L — SIGNIFICANT CHANGE UP (ref 7–14)
APPEARANCE UR: CLEAR — SIGNIFICANT CHANGE UP
APTT BLD: 44.9 SEC — HIGH (ref 27–39.2)
AST SERPL-CCNC: 14 U/L — SIGNIFICANT CHANGE UP (ref 0–41)
BASOPHILS # BLD AUTO: 0.15 K/UL — SIGNIFICANT CHANGE UP (ref 0–0.2)
BASOPHILS NFR BLD AUTO: 1.9 % — HIGH (ref 0–1)
BILIRUB SERPL-MCNC: 0.7 MG/DL — SIGNIFICANT CHANGE UP (ref 0.2–1.2)
BILIRUB UR-MCNC: NEGATIVE — SIGNIFICANT CHANGE UP
BUN SERPL-MCNC: 17 MG/DL — SIGNIFICANT CHANGE UP (ref 10–20)
CALCIUM SERPL-MCNC: 9.3 MG/DL — SIGNIFICANT CHANGE UP (ref 8.5–10.1)
CHLORIDE SERPL-SCNC: 106 MMOL/L — SIGNIFICANT CHANGE UP (ref 98–110)
CO2 SERPL-SCNC: 26 MMOL/L — SIGNIFICANT CHANGE UP (ref 17–32)
COLOR SPEC: YELLOW — SIGNIFICANT CHANGE UP
CREAT SERPL-MCNC: 1.1 MG/DL — SIGNIFICANT CHANGE UP (ref 0.7–1.5)
DIFF PNL FLD: NEGATIVE — SIGNIFICANT CHANGE UP
EOSINOPHIL # BLD AUTO: 0.3 K/UL — SIGNIFICANT CHANGE UP (ref 0–0.7)
EOSINOPHIL NFR BLD AUTO: 3.8 % — SIGNIFICANT CHANGE UP (ref 0–8)
GLUCOSE SERPL-MCNC: 70 MG/DL — SIGNIFICANT CHANGE UP (ref 70–99)
GLUCOSE UR QL: NEGATIVE — SIGNIFICANT CHANGE UP
HCT VFR BLD CALC: 48 % — HIGH (ref 37–47)
HGB BLD-MCNC: 15.5 G/DL — SIGNIFICANT CHANGE UP (ref 12–16)
IMM GRANULOCYTES NFR BLD AUTO: 0.9 % — HIGH (ref 0.1–0.3)
INR BLD: 1.69 RATIO — HIGH (ref 0.65–1.3)
KETONES UR-MCNC: NEGATIVE — SIGNIFICANT CHANGE UP
LEUKOCYTE ESTERASE UR-ACNC: NEGATIVE — SIGNIFICANT CHANGE UP
LYMPHOCYTES # BLD AUTO: 2.07 K/UL — SIGNIFICANT CHANGE UP (ref 1.2–3.4)
LYMPHOCYTES # BLD AUTO: 26.2 % — SIGNIFICANT CHANGE UP (ref 20.5–51.1)
MCHC RBC-ENTMCNC: 28.2 PG — SIGNIFICANT CHANGE UP (ref 27–31)
MCHC RBC-ENTMCNC: 32.3 G/DL — SIGNIFICANT CHANGE UP (ref 32–37)
MCV RBC AUTO: 87.3 FL — SIGNIFICANT CHANGE UP (ref 81–99)
MONOCYTES # BLD AUTO: 0.56 K/UL — SIGNIFICANT CHANGE UP (ref 0.1–0.6)
MONOCYTES NFR BLD AUTO: 7.1 % — SIGNIFICANT CHANGE UP (ref 1.7–9.3)
NEUTROPHILS # BLD AUTO: 4.75 K/UL — SIGNIFICANT CHANGE UP (ref 1.4–6.5)
NEUTROPHILS NFR BLD AUTO: 60.1 % — SIGNIFICANT CHANGE UP (ref 42.2–75.2)
NITRITE UR-MCNC: NEGATIVE — SIGNIFICANT CHANGE UP
NRBC # BLD: 0 /100 WBCS — SIGNIFICANT CHANGE UP (ref 0–0)
PH UR: 7 — SIGNIFICANT CHANGE UP (ref 5–8)
PLATELET # BLD AUTO: 274 K/UL — SIGNIFICANT CHANGE UP (ref 130–400)
POTASSIUM SERPL-MCNC: 4.5 MMOL/L — SIGNIFICANT CHANGE UP (ref 3.5–5)
POTASSIUM SERPL-SCNC: 4.5 MMOL/L — SIGNIFICANT CHANGE UP (ref 3.5–5)
PROT SERPL-MCNC: 7.1 G/DL — SIGNIFICANT CHANGE UP (ref 6–8)
PROT UR-MCNC: SIGNIFICANT CHANGE UP
PROTHROM AB SERPL-ACNC: 19.4 SEC — HIGH (ref 9.95–12.87)
RBC # BLD: 5.5 M/UL — HIGH (ref 4.2–5.4)
RBC # FLD: 13.4 % — SIGNIFICANT CHANGE UP (ref 11.5–14.5)
SODIUM SERPL-SCNC: 143 MMOL/L — SIGNIFICANT CHANGE UP (ref 135–146)
SP GR SPEC: 1.02 — SIGNIFICANT CHANGE UP (ref 1.01–1.03)
UROBILINOGEN FLD QL: SIGNIFICANT CHANGE UP
WBC # BLD: 7.9 K/UL — SIGNIFICANT CHANGE UP (ref 4.8–10.8)
WBC # FLD AUTO: 7.9 K/UL — SIGNIFICANT CHANGE UP (ref 4.8–10.8)

## 2021-07-23 PROCEDURE — 71046 X-RAY EXAM CHEST 2 VIEWS: CPT | Mod: 26

## 2021-07-23 PROCEDURE — 93010 ELECTROCARDIOGRAM REPORT: CPT

## 2021-07-23 RX ORDER — AMLODIPINE BESYLATE 2.5 MG/1
1 TABLET ORAL
Qty: 0 | Refills: 0 | DISCHARGE

## 2021-07-23 RX ORDER — METOPROLOL TARTRATE 50 MG
12.5 TABLET ORAL
Qty: 0 | Refills: 0 | DISCHARGE

## 2021-07-23 NOTE — H&P PST ADULT - MALLAMPATI CLASS
Continue atenolol  Call with any recurrent symptoms  Follow-up with me in a month  
Class III - visualization of the soft palate and the base of the uvula

## 2021-07-23 NOTE — H&P PST ADULT - HISTORY OF PRESENT ILLNESS
Anesthesia Alert  NO--Difficult Airway  NO--History of neck surgery or radiation  NO--Limited ROM of neck  NO--History of Malignant hyperthermia  NO--Personal or family history of Pseudocholinesterase deficiency.  NO--Prior Anesthesia Complication  NO--Latex Allergy  NO--Loose teeth  NO--History of Rheumatoid Arthritis  NO--LEVI  yes--Bleeding risk  NO--Other_____    PT is on xarelto    CC: pt was diagnosed with AFIB november 2021; prior to that no heart issue or health issues; doesn't know when she's on AFIB; asymptomatic    Mallamapti: 3    FOS: 1    Received covid vaccine but record not in CN; advised to go for covid-test as scheduled

## 2021-07-23 NOTE — H&P PST ADULT - REASON FOR ADMISSION
Patient is a _76____ year old ___female presenting to [facility] in preparation for _EP study AFIB/AFLUTTER; mapping; afib ablation; transesophageal echocardiogram_____ on ___08/06/21___ under ____general___ anesthesia by  __Jasmyn_______ .

## 2021-07-23 NOTE — H&P PST ADULT - NSICDXFAMILYHX_GEN_ALL_CORE_FT
FAMILY HISTORY:  Father  Still living? Unknown  FH: prostate cancer, Age at diagnosis: Age Unknown    Sibling  Still living? Unknown  FH: heart disease, Age at diagnosis: Age Unknown

## 2021-07-24 LAB
CULTURE RESULTS: SIGNIFICANT CHANGE UP
SPECIMEN SOURCE: SIGNIFICANT CHANGE UP

## 2021-07-26 ENCOUNTER — OUTPATIENT (OUTPATIENT)
Dept: OUTPATIENT SERVICES | Facility: HOSPITAL | Age: 76
LOS: 1 days | Discharge: HOME | End: 2021-07-26

## 2021-07-26 DIAGNOSIS — Z02.9 ENCOUNTER FOR ADMINISTRATIVE EXAMINATIONS, UNSPECIFIED: ICD-10-CM

## 2021-07-26 DIAGNOSIS — M51.26 OTHER INTERVERTEBRAL DISC DISPLACEMENT, LUMBAR REGION: Chronic | ICD-10-CM

## 2021-07-26 DIAGNOSIS — Z98.890 OTHER SPECIFIED POSTPROCEDURAL STATES: Chronic | ICD-10-CM

## 2021-07-28 ENCOUNTER — RESULT REVIEW (OUTPATIENT)
Age: 76
End: 2021-07-28

## 2021-07-28 ENCOUNTER — OUTPATIENT (OUTPATIENT)
Dept: OUTPATIENT SERVICES | Facility: HOSPITAL | Age: 76
LOS: 1 days | Discharge: HOME | End: 2021-07-28
Payer: MEDICARE

## 2021-07-28 DIAGNOSIS — Z02.9 ENCOUNTER FOR ADMINISTRATIVE EXAMINATIONS, UNSPECIFIED: ICD-10-CM

## 2021-07-28 DIAGNOSIS — Z98.890 OTHER SPECIFIED POSTPROCEDURAL STATES: Chronic | ICD-10-CM

## 2021-07-28 DIAGNOSIS — M51.26 OTHER INTERVERTEBRAL DISC DISPLACEMENT, LUMBAR REGION: Chronic | ICD-10-CM

## 2021-07-28 LAB
APPEARANCE UR: CLEAR — SIGNIFICANT CHANGE UP
BACTERIA # UR AUTO: NEGATIVE — SIGNIFICANT CHANGE UP
BILIRUB UR-MCNC: NEGATIVE — SIGNIFICANT CHANGE UP
COLOR SPEC: YELLOW — SIGNIFICANT CHANGE UP
CULTURE RESULTS: SIGNIFICANT CHANGE UP
DIFF PNL FLD: NEGATIVE — SIGNIFICANT CHANGE UP
EPI CELLS # UR: 4 /HPF — SIGNIFICANT CHANGE UP (ref 0–5)
GLUCOSE UR QL: NEGATIVE — SIGNIFICANT CHANGE UP
HYALINE CASTS # UR AUTO: 10 /LPF — HIGH (ref 0–7)
KETONES UR-MCNC: NEGATIVE — SIGNIFICANT CHANGE UP
LEUKOCYTE ESTERASE UR-ACNC: ABNORMAL
NITRITE UR-MCNC: NEGATIVE — SIGNIFICANT CHANGE UP
PH UR: 6 — SIGNIFICANT CHANGE UP (ref 5–8)
PROT UR-MCNC: ABNORMAL
RBC CASTS # UR COMP ASSIST: 3 /HPF — SIGNIFICANT CHANGE UP (ref 0–4)
SP GR SPEC: 1.03 — SIGNIFICANT CHANGE UP (ref 1.01–1.03)
SPECIMEN SOURCE: SIGNIFICANT CHANGE UP
UROBILINOGEN FLD QL: ABNORMAL
WBC UR QL: 11 /HPF — HIGH (ref 0–5)

## 2021-07-28 PROCEDURE — 71046 X-RAY EXAM CHEST 2 VIEWS: CPT | Mod: 26

## 2021-07-29 LAB
CULTURE RESULTS: SIGNIFICANT CHANGE UP
SPECIMEN SOURCE: SIGNIFICANT CHANGE UP

## 2021-08-02 ENCOUNTER — APPOINTMENT (OUTPATIENT)
Dept: CARDIOLOGY | Facility: CLINIC | Age: 76
End: 2021-08-02

## 2021-08-03 ENCOUNTER — OUTPATIENT (OUTPATIENT)
Dept: OUTPATIENT SERVICES | Facility: HOSPITAL | Age: 76
LOS: 1 days | Discharge: HOME | End: 2021-08-03

## 2021-08-03 DIAGNOSIS — M51.26 OTHER INTERVERTEBRAL DISC DISPLACEMENT, LUMBAR REGION: Chronic | ICD-10-CM

## 2021-08-03 DIAGNOSIS — Z98.890 OTHER SPECIFIED POSTPROCEDURAL STATES: Chronic | ICD-10-CM

## 2021-08-03 DIAGNOSIS — Z11.59 ENCOUNTER FOR SCREENING FOR OTHER VIRAL DISEASES: ICD-10-CM

## 2021-08-06 ENCOUNTER — INPATIENT (INPATIENT)
Facility: HOSPITAL | Age: 76
LOS: 0 days | Discharge: HOME | End: 2021-08-07
Attending: INTERNAL MEDICINE | Admitting: INTERNAL MEDICINE
Payer: MEDICARE

## 2021-08-06 VITALS — WEIGHT: 160.06 LBS | HEIGHT: 66 IN

## 2021-08-06 DIAGNOSIS — I48.0 PAROXYSMAL ATRIAL FIBRILLATION: ICD-10-CM

## 2021-08-06 DIAGNOSIS — M51.26 OTHER INTERVERTEBRAL DISC DISPLACEMENT, LUMBAR REGION: Chronic | ICD-10-CM

## 2021-08-06 DIAGNOSIS — Z98.890 OTHER SPECIFIED POSTPROCEDURAL STATES: Chronic | ICD-10-CM

## 2021-08-06 PROCEDURE — 93320 DOPPLER ECHO COMPLETE: CPT | Mod: 26

## 2021-08-06 PROCEDURE — 93662 INTRACARDIAC ECG (ICE): CPT | Mod: 26

## 2021-08-06 PROCEDURE — 93312 ECHO TRANSESOPHAGEAL: CPT | Mod: 26

## 2021-08-06 PROCEDURE — 93623 PRGRMD STIMJ&PACG IV RX NFS: CPT | Mod: 26

## 2021-08-06 PROCEDURE — 93655 ICAR CATH ABLTJ DSCRT ARRHYT: CPT

## 2021-08-06 PROCEDURE — 93656 COMPRE EP EVAL ABLTJ ATR FIB: CPT

## 2021-08-06 PROCEDURE — 76937 US GUIDE VASCULAR ACCESS: CPT | Mod: 26

## 2021-08-06 PROCEDURE — 93325 DOPPLER ECHO COLOR FLOW MAPG: CPT | Mod: 26

## 2021-08-06 PROCEDURE — 93613 INTRACARDIAC EPHYS 3D MAPG: CPT

## 2021-08-06 RX ORDER — METOPROLOL TARTRATE 50 MG
12.5 TABLET ORAL DAILY
Refills: 0 | Status: DISCONTINUED | OUTPATIENT
Start: 2021-08-06 | End: 2021-08-07

## 2021-08-06 RX ORDER — AMIODARONE HYDROCHLORIDE 400 MG/1
200 TABLET ORAL DAILY
Refills: 0 | Status: DISCONTINUED | OUTPATIENT
Start: 2021-08-06 | End: 2021-08-07

## 2021-08-06 RX ORDER — AMLODIPINE BESYLATE 2.5 MG/1
5 TABLET ORAL DAILY
Refills: 0 | Status: DISCONTINUED | OUTPATIENT
Start: 2021-08-06 | End: 2021-08-07

## 2021-08-06 RX ORDER — RIVAROXABAN 15 MG-20MG
20 KIT ORAL DAILY
Refills: 0 | Status: DISCONTINUED | OUTPATIENT
Start: 2021-08-06 | End: 2021-08-07

## 2021-08-06 RX ADMIN — RIVAROXABAN 20 MILLIGRAM(S): KIT at 18:22

## 2021-08-06 NOTE — CHART NOTE - NSCHARTNOTEFT_GEN_A_CORE
PROCEDURES:   •	AFIB Ablation by Pulmonary Vein Isolation (PVI)  •	EP study with CS catheter placement and pacing  •	Single transseptal puncture with LA entry  •	Intracardiac echocardiography  •	Ultrasound for venous access  •	Three-dimensional intracardiac electro-anatomic mapping   •	Acute drug testing/IV Drug-Isuprel Administration   •	Cavo-tricuspid isthmus ablation                                    BRIEF SUMMARY: Arrival in SR. ?small apical effusion vs fat. 1 uneventful TSP- mobile septum. PVI-1st pass except left sided vein- anthony connected, while veins were isolated- anthony ablation isolated both veins. Flutter induced with isuprel- CTI performed. Went into AF due to PAC from CS catheter placed in lillie- needed DCCV. CTI line mapped with HD grid. No effusion at the end.    : Rock Vines MD, [facility], [facility]     INDICATION FOR PROCEDURE: Symptomatic paroxysmal atrial fibrillation intolerant/refractory to anti-arrhythmic medication     LEEANN was done prior to the procedure showed left atrial enlargement with no left atrial thrombus. Please see separate report for detailed findings.     CONSENT: The risks, benefits and alternatives to the procedure have been described to the patient in detail. All questions were answered.  The patient expressed understanding and has agreed to proceed.      PRESENTING RHYTHM: Sinus Rhythm    DETAILS OF PROCEDURE: The patient was brought to the EP Lab in a fasting state after informed and written consent was obtained. Cardiac rhythm, blood pressure, heart rate, respirations, oxygen saturation and level of consciousness were monitored prior to, throughout and after the procedure. General anesthesia was administered by trained staff members with the supervision of an anesthesiology attending. The patient was placed supine on the fluoroscopy table, prepped and draped in the usual sterile fashion.  Local anesthetic was provided. Ultrasound was utilized to confirm femoral venous patency. Needle access was obtained under ultrasound guidance and a permanent recording obtained.  Venous access was obtained using a micropuncture needle in the right/left femoral veins under fluoroscopic/anatomic and ultrasound guidance using the modified Seldinger technique.  Guidewires were placed through each venous access. One 8.5 French SL-1 sheaths in right femoral vein, and one 11 French and a 6 French short sheaths in left femoral vein.      INTRACARDIAC ECHO:  Intracardiac echocardiography was performed under the guidance of fluoroscopy from the mid right atrium. Cardiac anatomy was assessed. There was trace baseline pericardial effusion. The left atrial size was enlarged. No thrombus was seen.  Post-procedure, the intracardiac echocardiogram was repeated and there remained unchanged.    CATHETER PLACEMENT: The intracardiac echo catheter was advanced via the femoral vein to the mid-right atrium. A HD grid catheter and Tacticath DF curve catheter were advanced via long sheaths into the left atrium after transseptal puncture.  A deflectable decapolar catheter was advanced in the right atrium to map atrial flutter.     SINGLE TRANSSEPTAL PUNCTURE WITH LA ENTRY: We used the RF transseptal needle under the guidance of fluoroscopic images and intracardiac echo. The intra-atrial septum was punctured and left atrial access obtained. Anticoagulation with Heparin IV bolus was immediately provided after puncture. We titrated the heparin drip per protocol to keep ACT greater than 350 throughout the left atrial ablation. ACT were checked every 15-20 minutes and adjusted per protocol. ICE imaging confirmed LA entry.     THREE DIMENSIONAL ELECTRO-ANATOMIC MAPPING: Using the mapping catheter and ABBOTT system, 3-D electro-anatomic mapping of the left atrium and pulmonary veins were created. The left atrial anatomy correlated well with cardiac CT.    PULMONARY VENOUS ISOLATION: Pulmonary vein potentials were identified at the antra of each vein. We performed a wide area circumferential ablation around the pulmonary veins to create electrical isolation.   All pulmonary venous antra were isolated successfully. Post ablation, pacing was done within and outside of the pulmonary vein to demonstrate conduction block both into and out of the vein. There was a connection in carinal area which was activating inferior part of LSPV and superior part of LIPV. Mapping performed with HD grid and catheter. We didn't identify any leak in the ablation line. We suspected epicardial connection. Ablation performed in carinal area resulted in complete isolation. The total time from last ablation lesion to confirmation of pulmonary vein entrance block was about 30 minutes. After completion of ablation, an EP study was performed including left atrial pacing and recording.    An esophageal temperature probe was used to monitor temperatures on the posterior wall. The esophagus course was leftward.  We were prepared to come off RF immediately when the esophageal temperature probe increased more than 1.0-degree C or to a max temperature of 38.5.  If a significant temperature rise was seen, the location was marked separately on the 3D non-contact map and maximum temperature achieved was recorded.  We came off ablation early due to temperature rises when ablating near RIPV.  We did not have to limit ablation lesions due to a rise in temperature. Resting and often going to other areas was performed when ablating near the esophagus to allow recovery of the esophagus between lesions. 20-25 sampson was used on the posterior wall of the left atrium and up to 30-35 sampson on the anterior wall. Points were marked with different colors on the 3D non-contact map based on time of treatment at each site. The anesthesia personnel in the room moved the esophageal temperature probe in line with the level of ablation when treating the posterior wall. The electrophysiology nurse, anesthesia staff and physician continuously monitored the temperature. During ablation, the impedance was closely monitored by both the physician and the electrophysiology staff. We came off ablation once the impedance decreased by 10% indicating a transmural burn.     Contact force was also monitored continuously during the ablation. An average of 10 grams of force (5-15 grams) were used on the posterior wall at 25-30 sampson for 15-20 seconds and no longer than 30 seconds. If the electrical signals did not decrease significantly, the power was increased to 30 sampson for an additional 5-10 seconds at each site.    Phrenic evaluation: Pacing was utilized with ablation anterior to the right sided pulmonary veins to confirm no phrenic capture at sites of ablation.     Dissociated Firing of the PVs: Did not occur.    Vagal Effect:  No marked response was present.    A Cavo-tricuspid isthmus ablation for right atrial flutter was performed.  RF ablation was applied in a linear fashion across the cavo-tricuspid isthmus using a spot and drag technique. Bidirectional block was achieved and confirmed with HD grid mapping.    INTRAATRIAL PACING: Entrance and exit block across all four PVs was confirmed by intra-atrial pacing and by pacing inside each vein.     EXTERNAL DC CARDIOVERSION:  was performed due to recurrent AF from AFL due to PAC caused by RA catheter placed in lillie region.     DRUG INFUSION: Isoproterenol 20 mcg/min was initiated for 5 minutes. During this time, patient had no atrial fibrillation. There was no significant atrial ectopy.     RAPID PACING: Rapid atrial pacing x3 to 250 msec did not induce atrial fibrillation or atrial flutter. On Isuprel washout, rapid atrial pacing x3 to 250 msec did not induce atrial fibrillation, but induced atrial flutter, which was confirmed to be CTI dependent flutter with entrainment mapping and activation mapping performed with HD grid.     DIAGNOSTIC EP STUDY:   Intervals: Sinus, CL 1023 msec, NJ interval 159 msec, QRSd 71 msec,  msec.   HV interval 52 msec.      Post ablation intracardiac echo showed no change in pericardial effusion or LA thrombus.   Sheaths were removed and 'figure-of-8' stitches were placed.  The patient tolerated the procedure well.         IMPRESSION:   Symptomatic early persistent Atrial Fibrillation status post successful pulmonary vein antral isolation   Cavo-tricuspid Isthmus Ablation  High dose isoproterenol infusion        PLAN:   - Will monitor patient overnight at the hospital.   - Bedrest for 4 hours  - Remove Simmons after bedrest is over  - Check access site/sites in the morning. Sutures will be removed in am.  - Continue anticoagulation with Eliquis  - Rate control: metoprolol-home dose  - Rhythm control medication: Continue Amiodarone  - Protonix/PPI for 4 weeks  - IV diuresis and potassium replacement as needed.   - Outpatient follow up in 1 month

## 2021-08-07 ENCOUNTER — TRANSCRIPTION ENCOUNTER (OUTPATIENT)
Age: 76
End: 2021-08-07

## 2021-08-07 VITALS
RESPIRATION RATE: 16 BRPM | TEMPERATURE: 96 F | SYSTOLIC BLOOD PRESSURE: 146 MMHG | HEART RATE: 65 BPM | DIASTOLIC BLOOD PRESSURE: 65 MMHG

## 2021-08-07 PROCEDURE — 99233 SBSQ HOSP IP/OBS HIGH 50: CPT

## 2021-08-07 PROCEDURE — 93010 ELECTROCARDIOGRAM REPORT: CPT

## 2021-08-07 RX ORDER — PANTOPRAZOLE SODIUM 20 MG/1
1 TABLET, DELAYED RELEASE ORAL
Qty: 30 | Refills: 0
Start: 2021-08-07 | End: 2021-09-05

## 2021-08-07 RX ADMIN — AMLODIPINE BESYLATE 5 MILLIGRAM(S): 2.5 TABLET ORAL at 05:28

## 2021-08-07 RX ADMIN — Medication 12.5 MILLIGRAM(S): at 05:28

## 2021-08-07 RX ADMIN — AMIODARONE HYDROCHLORIDE 200 MILLIGRAM(S): 400 TABLET ORAL at 05:28

## 2021-08-07 NOTE — PROGRESS NOTE ADULT - ASSESSMENT
Assessment: 75 yo F with hx of AF sp ablation, POD#1 and feeling well    Impression:  PAF sp Ablation  CHF    Plan:  - Continue Xarelto 20 mg daily  - Continue Amiodarone 200mg daily and Lopressor 12.5mg daily  - Continue all other home medications  - Start Protonix 40mg daily for one month  - No heavy lifting or squatting for two weeks  - Can shower today, do not scrub groin area vigorously  - Follow up with Dr Vines in 3-4 weeks  [address] [facility] [address]  [facility] [place] [address] [phone]

## 2021-08-07 NOTE — DISCHARGE NOTE NURSING/CASE MANAGEMENT/SOCIAL WORK - PATIENT PORTAL LINK FT
You can access the FollowMyHealth Patient Portal offered by [facility] by registering at the following website: [email or web address]. By joining [employer]’s FollowMyHealth portal, you will also be able to view your health information using other applications (apps) compatible with our system.

## 2021-08-07 NOTE — PROGRESS NOTE ADULT - SUBJECTIVE AND OBJECTIVE BOX
INTERVAL HPI/OVERNIGHT EVENTS: No acute events overnight, patient feeling well    MEDICATIONS  (STANDING):  aMIOdarone    Tablet 200 milliGRAM(s) Oral daily  amLODIPine   Tablet 5 milliGRAM(s) Oral daily  metoprolol tartrate 12.5 milliGRAM(s) Oral daily  rivaroxaban 20 milliGRAM(s) Oral daily    MEDICATIONS  (PRN):      Allergies  No Known Allergies    Intolerances        REVIEW OF SYSTEMS: No CP, palpitations, dizziness or SOB    Vital Signs Last 24 Hrs  T(C): 36.6 (07 Aug 2021 05:28), Max: 36.6 (07 Aug 2021 05:28)  T(F): 97.8 (07 Aug 2021 05:28), Max: 97.8 (07 Aug 2021 05:28)  HR: 63 (07 Aug 2021 05:28) (63 - 67)  BP: 123/70 (07 Aug 2021 05:28) (122/75 - 138/73)  BP(mean): --  RR: 14 (06 Aug 2021 20:36) (14 - 16)  SpO2: 94% (06 Aug 2021 20:00) (94% - 94%)      Physical Exam  GENERAL: In no apparent distress, well nourished, and hydrated.  EYES: EOMI, PERRLA, conjunctiva and sclera clear  ENMT: No tonsillar erythema, exudates, or enlargements; ist mucous membranes, Good dentition, No lesions  NECK: Supple   HEART: Regular rate and rhythm; No murmurs, rubs, or gallops.  PULMONARY: Clear to auscultation and perfusion.  No rales, wheezing, or rhonchi bilaterally.  ABDOMEN: Soft, Nontender, Nondistended; Bowel sounds present  EXTREMITIES:  2+ Peripheral Pulses, No clubbing, cyanosis, or edema  SKIN: Sutures removed from BL groins, no hematoma  NEUROLOGICAL: Grossly nonfocal    LABS:    TELE: NSR 69 bpm    RADIOLOGY & ADDITIONAL TESTS:

## 2021-08-07 NOTE — DISCHARGE NOTE PROVIDER - NSDCFUADDINST_GEN_ALL_CORE_FT
- Continue Xarelto 20 mg daily  - Continue Amiodarone 200mg daily and Lopressor 12.5mg daily  - Continue all other home medications  - Start Protonix 40mg daily for one month  - No heavy lifting or squatting for two weeks  - Can shower today, do not scrub groin area vigorously

## 2021-08-07 NOTE — DISCHARGE NOTE PROVIDER - NSDCMRMEDTOKEN_GEN_ALL_CORE_FT
amiodarone 200 mg oral tablet: 1 tab(s) orally once a day   amLODIPine 5 mg oral tablet: 1 tab(s) orally once a day  metoprolol: 12.5 milligram(s) orally once a day  Protonix 40 mg oral delayed release tablet: 1 tab(s) orally once a day   Xarelto 20 mg oral tablet: 1 tab(s) orally once a day (at bedtime)

## 2021-08-07 NOTE — DISCHARGE NOTE PROVIDER - CARE PROVIDER_API CALL
Rock Vines (MD)  Cardiac Electrophysiology; Cardiology; Internal Medicine  [address] [place] [address]  Phone: [phone]  Fax: [phone]  Follow Up Time: 1 month

## 2021-08-08 LAB
COVID-19 SPIKE DOMAIN AB INTERP: POSITIVE
COVID-19 SPIKE DOMAIN ANTIBODY RESULT: >250 U/ML — HIGH
SARS-COV-2 IGG+IGM SERPL QL IA: >250 U/ML — HIGH
SARS-COV-2 IGG+IGM SERPL QL IA: POSITIVE

## 2021-08-10 DIAGNOSIS — I49.1 ATRIAL PREMATURE DEPOLARIZATION: ICD-10-CM

## 2021-08-10 DIAGNOSIS — Z79.01 LONG TERM (CURRENT) USE OF ANTICOAGULANTS: ICD-10-CM

## 2021-08-10 DIAGNOSIS — I48.91 UNSPECIFIED ATRIAL FIBRILLATION: ICD-10-CM

## 2021-08-10 DIAGNOSIS — I48.19 OTHER PERSISTENT ATRIAL FIBRILLATION: ICD-10-CM

## 2021-09-07 ENCOUNTER — APPOINTMENT (OUTPATIENT)
Dept: CARDIOLOGY | Facility: CLINIC | Age: 76
End: 2021-09-07
Payer: MEDICARE

## 2021-09-07 VITALS
HEIGHT: 69 IN | TEMPERATURE: 97.1 F | BODY MASS INDEX: 24.88 KG/M2 | SYSTOLIC BLOOD PRESSURE: 140 MMHG | WEIGHT: 168 LBS | DIASTOLIC BLOOD PRESSURE: 70 MMHG | HEART RATE: 47 BPM

## 2021-09-07 VITALS — HEART RATE: 47 BPM

## 2021-09-07 PROCEDURE — 93000 ELECTROCARDIOGRAM COMPLETE: CPT

## 2021-09-07 PROCEDURE — 99214 OFFICE O/P EST MOD 30 MIN: CPT

## 2021-09-07 NOTE — PHYSICAL EXAM
[General Appearance - Well Developed] : well developed [Normal Appearance] : normal appearance [Well Groomed] : well groomed [General Appearance - Well Nourished] : well nourished [No Deformities] : no deformities [General Appearance - In No Acute Distress] : no acute distress [Normal Conjunctiva] : the conjunctiva exhibited no abnormalities [Eyelids - No Xanthelasma] : the eyelids demonstrated no xanthelasmas [Normal Oral Mucosa] : normal oral mucosa [No Oral Pallor] : no oral pallor [No Oral Cyanosis] : no oral cyanosis [Normal Rate] : normal [Rhythm Regular] : regular [No Murmur] : no murmurs heard [2+] : left 2+ [No Pitting Edema] : no pitting edema present [Respiration, Rhythm And Depth] : normal respiratory rhythm and effort [Exaggerated Use Of Accessory Muscles For Inspiration] : no accessory muscle use [Auscultation Breath Sounds / Voice Sounds] : lungs were clear to auscultation bilaterally [Abdomen Soft] : soft [Abdomen Tenderness] : non-tender [Abdomen Mass (___ Cm)] : no abdominal mass palpated [Abnormal Walk] : normal gait [Gait - Sufficient For Exercise Testing] : the gait was sufficient for exercise testing [Skin Color & Pigmentation] : normal skin color and pigmentation [No Venous Stasis] : no venous stasis [] : no rash [Skin Lesions] : no skin lesions [No Skin Ulcers] : no skin ulcer [No Xanthoma] : no  xanthoma was observed [Oriented To Time, Place, And Person] : oriented to person, place, and time [Affect] : the affect was normal [Mood] : the mood was normal [No Anxiety] : not feeling anxious [FreeTextEntry1] : No JVD

## 2021-09-07 NOTE — REVIEW OF SYSTEMS
[Feeling Fatigued] : feeling fatigued [SOB] : shortness of breath [Joint Pain] : joint pain [Negative] : Psychiatric

## 2021-09-07 NOTE — ASSESSMENT
[FreeTextEntry1] : The patient has had an AF ablation . She has not had chest pain and has occasional MARTI . She feel fatigued . She remaines bradycardic . She may have a component of tachybrady . MR had initially improved after being in NSR . She is mony on AMio and low dose beta blocker which is contributing to this  She is on Amlodipine as well .

## 2021-09-07 NOTE — CARDIOLOGY SUMMARY
[___] : [unfilled] [de-identified] : 6- Sinus bradycardia NS T wave change. \par 9-7-2021 Sinus Bradycardia  [de-identified] : 6- EF 59% mild to mod MR trace TR RVSP was 30 mmhg mild dilitation of the Sinus of Valsalva

## 2021-09-07 NOTE — HISTORY OF PRESENT ILLNESS
[FreeTextEntry1] : The patient had a PVI/AF Ablation . The patient has fatigue since the procedure. No lightheadedness . Mukund e intermittent DACOSTA . No chest pain  .

## 2021-09-09 ENCOUNTER — APPOINTMENT (OUTPATIENT)
Dept: CARDIOLOGY | Facility: CLINIC | Age: 76
End: 2021-09-09
Payer: MEDICARE

## 2021-09-09 VITALS
HEART RATE: 55 BPM | SYSTOLIC BLOOD PRESSURE: 119 MMHG | WEIGHT: 160 LBS | HEIGHT: 66 IN | BODY MASS INDEX: 25.71 KG/M2 | DIASTOLIC BLOOD PRESSURE: 77 MMHG | TEMPERATURE: 97.1 F

## 2021-09-09 PROCEDURE — 99214 OFFICE O/P EST MOD 30 MIN: CPT

## 2021-09-09 PROCEDURE — 93000 ELECTROCARDIOGRAM COMPLETE: CPT

## 2021-09-09 RX ORDER — PANTOPRAZOLE 40 MG/1
40 TABLET, DELAYED RELEASE ORAL
Qty: 30 | Refills: 0 | Status: COMPLETED | COMMUNITY
Start: 2021-08-07 | End: 2021-09-09

## 2021-09-10 NOTE — ASSESSMENT
[FreeTextEntry1] : ## persistent atrial fibrillation s/p DCCV (x2) s/p RFA (PVI +CTI, 07/21)\par ## HFrEF- recovered post DCCV\par ## Moderate to severe MR- mild to mod in SR\par ## Sinus Bradycardia\par \par - Feels mild fatigue (more than SOB) likely due to bradycardia. We reduced metoprolol last time. We will DC it completely. We will plan to DC amiodarone next visit. Continue Amiodarone for now\par - Euvolemic today. Lasix as needed\par - Continue with Xarelto \par - Return in 3 month

## 2021-09-10 NOTE — PHYSICAL EXAM
[Well Developed] : well developed [Well Nourished] : well nourished [No Acute Distress] : no acute distress [Normal Conjunctiva] : normal conjunctiva [Normal Venous Pressure] : normal venous pressure [No Carotid Bruit] : no carotid bruit [Normal S1, S2] : normal S1, S2 [No Murmur] : no murmur [No Rub] : no rub [No Gallop] : no gallop [Clear Lung Fields] : clear lung fields [Good Air Entry] : good air entry [No Respiratory Distress] : no respiratory distress  [Soft] : abdomen soft [Non Tender] : non-tender [No Masses/organomegaly] : no masses/organomegaly [Normal Bowel Sounds] : normal bowel sounds [Normal Gait] : normal gait [No Edema] : no edema [No Cyanosis] : no cyanosis [No Clubbing] : no clubbing [No Varicosities] : no varicosities [No Rash] : no rash [Moves all extremities] : moves all extremities [No Skin Lesions] : no skin lesions [No Focal Deficits] : no focal deficits [Normal Speech] : normal speech [Alert and Oriented] : alert and oriented [Normal memory] : normal memory

## 2021-09-10 NOTE — HISTORY OF PRESENT ILLNESS
[FreeTextEntry1] : I had a pleasure of seeing Ms. VELASQUEZ for follow-up consultation for Atrial Fibrillation. \par \par Ms. VELASQUEZ is a 76 year-year old female with history of persistent atrial fibrillation s/p DCCV (x2 in 05/21), moderate to severe MR during AF, HFrEF is here for f-up.\par Feels fatigue.\par Denies chest pain, shortness of breath, palpitation, dizziness or LOC except noted above.\par \par 6/30: Feels better. Remains in sinus. Echo in sinus shows mild to mod MR.\par 9/9: s/p AF/AFL ablation. Feels much better. Mild fatigue on exertion. No chest pain/SOB at rest.\par \par EKG (09/09): SR@55, MN 152ms,  ms\par EKG (06/30): SR@59/min, MN: 172 ms, QRSd 90 ms\par EKG: SR@ 48/min,  ms, QRS 88 ms\par TTE (06/16/21): EF 35-40%, mod MR/LAE\par TTE (06/30/21-post DCCV): EF 59%, mild to mod MR\par Cardio: Dr. Hairston

## 2021-11-18 ENCOUNTER — APPOINTMENT (OUTPATIENT)
Dept: CARDIOLOGY | Facility: CLINIC | Age: 76
End: 2021-11-18
Payer: MEDICARE

## 2021-11-18 VITALS
BODY MASS INDEX: 26.52 KG/M2 | HEIGHT: 66 IN | DIASTOLIC BLOOD PRESSURE: 83 MMHG | RESPIRATION RATE: 16 BRPM | WEIGHT: 165 LBS | TEMPERATURE: 97.9 F | HEART RATE: 72 BPM | SYSTOLIC BLOOD PRESSURE: 168 MMHG

## 2021-11-18 PROCEDURE — 93000 ELECTROCARDIOGRAM COMPLETE: CPT

## 2021-11-18 PROCEDURE — 99214 OFFICE O/P EST MOD 30 MIN: CPT

## 2021-11-18 RX ORDER — METOPROLOL TARTRATE 25 MG/1
25 TABLET, FILM COATED ORAL
Qty: 45 | Refills: 3 | Status: DISCONTINUED | COMMUNITY
End: 2021-11-18

## 2021-11-18 NOTE — HISTORY OF PRESENT ILLNESS
[FreeTextEntry1] : I had a pleasure of seeing Ms. VELASQUEZ for follow-up consultation for Atrial Fibrillation. \par \par Ms. VELASQUEZ is a 76 year-year old female with history of persistent atrial fibrillation s/p DCCV (x2 in 05/21), moderate to severe MR during AF, HFrEF is here for f-up.\par Feels fatigue.\par Denies chest pain, shortness of breath, palpitation, dizziness or LOC except noted above.\par \par 6/30: Feels better. Remains in sinus. Echo in sinus shows mild to mod MR.\par 9/9: s/p AF/AFL ablation. Feels much better. Mild fatigue on exertion. No chest pain/SOB at rest.\par 11/18: Feels excellent. Was concerned about puffy legs. \par Denies chest pain, shortness of breath, palpitation, dizziness or LOC except noted above.\par \par EKG (11/18): SR @ 59\par EKG (09/09): SR@55, RI 152ms,  ms\par EKG (06/30): SR@59/min, RI: 172 ms, QRSd 90 ms\par EKG: SR@ 48/min,  ms, QRS 88 ms\par TTE (06/16/21): EF 35-40%, mod MR/LAE\par TTE (06/30/21-post DCCV): EF 59%, mild to mod MR\par Cardio: Dr. Hairston

## 2021-11-18 NOTE — ASSESSMENT
[FreeTextEntry1] : ## persistent atrial fibrillation s/p DCCV (x2)\par ## HFrEF\par ## Moderate to severe MR\par ## HTN\par \par - Feels better now. Will continue with metoprolol\par - Continue Amiodarone. Will reduce to 100 mg q24h. May DC next visit if no issues.\par - Euvolemic today. Not on lasix anymore.\par - Continue with Xarelto. Compliant. No bleeding issues.\par - BP high again today. Will increase her norvasc to 5 mg. BP diary at home.\par - Return in 3 months

## 2021-12-30 ENCOUNTER — APPOINTMENT (OUTPATIENT)
Dept: CARDIOLOGY | Facility: CLINIC | Age: 76
End: 2021-12-30
Payer: MEDICARE

## 2021-12-30 VITALS — BODY MASS INDEX: 27.48 KG/M2 | HEIGHT: 66 IN | WEIGHT: 171 LBS

## 2021-12-30 VITALS — DIASTOLIC BLOOD PRESSURE: 70 MMHG | SYSTOLIC BLOOD PRESSURE: 120 MMHG

## 2021-12-30 PROCEDURE — 93000 ELECTROCARDIOGRAM COMPLETE: CPT

## 2021-12-30 PROCEDURE — 99214 OFFICE O/P EST MOD 30 MIN: CPT

## 2021-12-30 NOTE — CARDIOLOGY SUMMARY
[___] : [unfilled] [de-identified] : 6- Sinus bradycardia NS T wave change. \par 9-7-2021 Sinus Bradycardia \par 12- NST NS T wave change.  [de-identified] : 6- EF 59% mild to mod MR trace TR RVSP was 30 mmhg mild dilitation of the Sinus of Valsalva

## 2021-12-30 NOTE — ASSESSMENT
[FreeTextEntry1] : The patient has been feeling better . She has not had chest pain. SOB has improved and her BP is stable. She has not had had recurrent AF after ablation and she remains on Amio LV systolic function seemed to have improved with better control of her atrial arrhythmias as had her MR .

## 2021-12-30 NOTE — HISTORY OF PRESENT ILLNESS
[FreeTextEntry1] : The patient has not had chest pain or SOB . Overall feels well. No palpations . She does not feel that she has had recurrent episodes of AF

## 2022-02-17 ENCOUNTER — APPOINTMENT (OUTPATIENT)
Dept: CARDIOLOGY | Facility: CLINIC | Age: 77
End: 2022-02-17
Payer: MEDICARE

## 2022-02-17 VITALS
HEART RATE: 61 BPM | DIASTOLIC BLOOD PRESSURE: 76 MMHG | HEIGHT: 66 IN | TEMPERATURE: 97.8 F | SYSTOLIC BLOOD PRESSURE: 138 MMHG | BODY MASS INDEX: 25.71 KG/M2 | WEIGHT: 160 LBS

## 2022-02-17 PROCEDURE — 93000 ELECTROCARDIOGRAM COMPLETE: CPT

## 2022-02-17 PROCEDURE — 99214 OFFICE O/P EST MOD 30 MIN: CPT

## 2022-02-17 RX ORDER — AMIODARONE HYDROCHLORIDE 200 MG/1
200 TABLET ORAL DAILY
Refills: 0 | Status: COMPLETED | COMMUNITY
End: 2022-02-17

## 2022-02-17 NOTE — ASSESSMENT
[FreeTextEntry1] : ## persistent atrial fibrillation s/p DCCV (x2) s/p ablation\par ## HFrEF- recovered\par ## Moderate to severe MR- better after ablation.\par ## HTN\par \par - Feels better after ablation.\par - On Xarelto. Compliant. No bleeding issues.\par - In sinus. Feels excellent. Will DC amiodarone. Continue BB\par - BP better\par - Return in 6 months

## 2022-02-17 NOTE — HISTORY OF PRESENT ILLNESS
[FreeTextEntry1] : I had a pleasure of seeing Ms. VELASQUEZ for follow-up consultation for Atrial Fibrillation. \par \par Ms. VELASQUEZ is a 76 year-year old female with history of persistent atrial fibrillation s/p DCCV (x2 in 05/21), moderate to severe MR during AF, HFrEF is here for f-up.\par Feels fatigue.\par Denies chest pain, shortness of breath, palpitation, dizziness or LOC except noted above.\par \par 6/30: Feels better. Remains in sinus. Echo in sinus shows mild to mod MR.\par 9/9: s/p AF/AFL ablation. Feels much better. Mild fatigue on exertion. No chest pain/SOB at rest.\par 11/18: Feels excellent. Was concerned about puffy legs.\par 2/17/22: Feels excellent. No c/o.\par \par Denies chest pain, shortness of breath, palpitation, dizziness or LOC except noted above.\par \par EKG (2/17/22): SR @ 60\par EKG (11/18): SR @ 59\par EKG (09/09): SR@55, AZ 152ms,  ms\par EKG (06/30): SR@59/min, AZ: 172 ms, QRSd 90 ms\par EKG: SR@ 48/min,  ms, QRS 88 ms\par TTE (06/16/21): EF 35-40%, mod MR/LAE\par TTE (06/30/21-post DCCV): EF 59%, mild to mod MR\par Cardio: Dr. Hairston

## 2022-02-18 ENCOUNTER — OUTPATIENT (OUTPATIENT)
Dept: OUTPATIENT SERVICES | Facility: HOSPITAL | Age: 77
LOS: 1 days | Discharge: HOME | End: 2022-02-18
Payer: MEDICARE

## 2022-02-18 ENCOUNTER — RESULT REVIEW (OUTPATIENT)
Age: 77
End: 2022-02-18

## 2022-02-18 DIAGNOSIS — I34.0 NONRHEUMATIC MITRAL (VALVE) INSUFFICIENCY: ICD-10-CM

## 2022-02-18 DIAGNOSIS — Z98.890 OTHER SPECIFIED POSTPROCEDURAL STATES: Chronic | ICD-10-CM

## 2022-02-18 DIAGNOSIS — M51.26 OTHER INTERVERTEBRAL DISC DISPLACEMENT, LUMBAR REGION: Chronic | ICD-10-CM

## 2022-02-18 PROCEDURE — 78452 HT MUSCLE IMAGE SPECT MULT: CPT | Mod: 26

## 2022-03-30 ENCOUNTER — APPOINTMENT (OUTPATIENT)
Dept: CARDIOLOGY | Facility: CLINIC | Age: 77
End: 2022-03-30
Payer: MEDICARE

## 2022-03-30 VITALS
HEART RATE: 67 BPM | WEIGHT: 170 LBS | BODY MASS INDEX: 27.32 KG/M2 | HEIGHT: 66 IN | DIASTOLIC BLOOD PRESSURE: 70 MMHG | SYSTOLIC BLOOD PRESSURE: 118 MMHG

## 2022-03-30 PROCEDURE — 99214 OFFICE O/P EST MOD 30 MIN: CPT

## 2022-03-30 PROCEDURE — 93000 ELECTROCARDIOGRAM COMPLETE: CPT

## 2022-03-30 NOTE — REASON FOR VISIT
[Arrhythmia/ECG Abnorrmalities] : arrhythmia/ECG abnormalities [Structural Heart and Valve Disease] : structural heart and valve disease [Hypertension] : hypertension [FreeTextEntry3] : Snow  [Consultation] : a consultation regarding [Atrial Fibrillation] : atrial fibrillation

## 2022-03-30 NOTE — CARDIOLOGY SUMMARY
[de-identified] : 6- Sinus bradycardia NS T wave change. \par 9-7-2021 Sinus Bradycardia \par 12- NST NS T wave change.  [de-identified] : 2-2022 Lexiscan stress test No sichemia .  [de-identified] : 6- EF 59% mild to mod MR trace TR RVSP was 30 mmhg mild dilitation of the Sinus of Valsalva  [___] : [unfilled]

## 2022-03-30 NOTE — PHYSICAL EXAM
[General Appearance - Well Developed] : well developed [Normal Appearance] : normal appearance [Well Groomed] : well groomed [General Appearance - Well Nourished] : well nourished [No Deformities] : no deformities [General Appearance - In No Acute Distress] : no acute distress [Normal Conjunctiva] : the conjunctiva exhibited no abnormalities [Eyelids - No Xanthelasma] : the eyelids demonstrated no xanthelasmas [Normal Oral Mucosa] : normal oral mucosa [No Oral Pallor] : no oral pallor [No Oral Cyanosis] : no oral cyanosis [FreeTextEntry1] : No JVD  [Normal Rate] : normal [Rhythm Regular] : regular [No Murmur] : no murmurs heard [2+] : left 2+ [No Pitting Edema] : no pitting edema present [Exaggerated Use Of Accessory Muscles For Inspiration] : no accessory muscle use [Respiration, Rhythm And Depth] : normal respiratory rhythm and effort [Auscultation Breath Sounds / Voice Sounds] : lungs were clear to auscultation bilaterally [Abdomen Soft] : soft [Abdomen Tenderness] : non-tender [Abdomen Mass (___ Cm)] : no abdominal mass palpated [Abnormal Walk] : normal gait [Gait - Sufficient For Exercise Testing] : the gait was sufficient for exercise testing [Skin Color & Pigmentation] : normal skin color and pigmentation [] : no rash [No Venous Stasis] : no venous stasis [Skin Lesions] : no skin lesions [No Skin Ulcers] : no skin ulcer [No Xanthoma] : no  xanthoma was observed [Affect] : the affect was normal [Oriented To Time, Place, And Person] : oriented to person, place, and time [Mood] : the mood was normal [No Anxiety] : not feeling anxious

## 2022-03-30 NOTE — HISTORY OF PRESENT ILLNESS
[FreeTextEntry1] : The patient has not had chest pain or SOB . Overall feels well. No palpations . She does not feel that she has had recurrent episodes of AF  .The patient has had an AF /AFL ablation . Nuclear stress test showed no ischemia

## 2022-05-02 NOTE — HISTORY OF PRESENT ILLNESS
Sara Callejas was seen and treated in our emergency department on 5/2/2022  Diagnosis: kristina    Alba Blade    She may return on this date: 05/04/2022         If you have any questions or concerns, please don't hesitate to call        Carli Dale MD    ______________________________           _______________          _______________  [facility] Representative                              Date                                Time [FreeTextEntry1] : The patient had DCCV to NSR . She had a LEEANN at the time which showed mod to severe MR . The patient went home and again when into AF . She had SOB and suspected CHF . She had DCCV to NSR , Since the second cardioversion she has been fatigued . She has had PAF on her MCOT monitor

## 2022-06-15 ENCOUNTER — APPOINTMENT (OUTPATIENT)
Dept: CARDIOLOGY | Facility: CLINIC | Age: 77
End: 2022-06-15
Payer: MEDICARE

## 2022-06-15 PROCEDURE — 93306 TTE W/DOPPLER COMPLETE: CPT

## 2022-08-11 ENCOUNTER — APPOINTMENT (OUTPATIENT)
Dept: CARDIOLOGY | Facility: CLINIC | Age: 77
End: 2022-08-11

## 2022-08-11 VITALS
WEIGHT: 150 LBS | HEIGHT: 66 IN | TEMPERATURE: 98 F | BODY MASS INDEX: 24.11 KG/M2 | HEART RATE: 56 BPM | SYSTOLIC BLOOD PRESSURE: 145 MMHG | DIASTOLIC BLOOD PRESSURE: 80 MMHG

## 2022-08-11 PROCEDURE — 99214 OFFICE O/P EST MOD 30 MIN: CPT | Mod: 25

## 2022-08-11 PROCEDURE — 93000 ELECTROCARDIOGRAM COMPLETE: CPT

## 2022-08-11 NOTE — ASSESSMENT
[FreeTextEntry1] : ## persistent atrial fibrillation s/p DCCV (x2) s/p ablation\par ## HFrEF- recovered\par ## Moderate to severe MR- better after ablation.\par ## Pre-op Assessment\par \par - Feels better after ablation.\par - On Xarelto. Compliant. No bleeding issues. Discussed long term monitoring with ILR for possibility of discontinuing OAC.\par - In sinus. Feels excellent. Will DC amiodarone and BB.\par - OK to hold Xarelto for 3 days for colonoscopy and any procedure pertaining to renal cyst\par - Return in 6 months

## 2022-08-11 NOTE — HISTORY OF PRESENT ILLNESS
[FreeTextEntry1] : I had a pleasure of seeing Ms. VELASQUEZ for follow-up consultation for Atrial Fibrillation. \par \par Ms. VELASQUEZ is a 76 year-year old female with history of persistent atrial fibrillation s/p DCCV (x2 in 05/21), moderate to severe MR during AF, HFrEF is here for f-up.\par Feels fatigue.\par Denies chest pain, shortness of breath, palpitation, dizziness or LOC except noted above.\par \par 6/30: Feels better. Remains in sinus. Echo in sinus shows mild to mod MR.\par 9/9: s/p AF/AFL ablation. Feels much better. Mild fatigue on exertion. No chest pain/SOB at rest.\par 11/18: Feels excellent. Was concerned about puffy legs.\par 2/17/22: Feels excellent. No c/o.\par 08/11/22: Feels fine. Going for colonoscopy. Found to have renal cyst.\par \par Denies chest pain, shortness of breath, palpitation, dizziness or LOC except noted above.\par \par EKG (08/11/22): SR@56\par EKG (2/17/22): SR @ 60\par EKG (11/18): SR @ 59\par EKG (09/09): SR@55, [place] 152ms,  ms\par EKG (06/30): SR@59/min, [place]: 172 ms, QRSd 90 ms\par EKG: SR@ 48/min,  ms, QRS 88 ms\par TTE (06/16/21): EF 35-40%, mod MR/LAE\par TTE (06/30/21-post DCCV): EF 59%, mild to mod MR\par Cardio: Dr. Hairston

## 2022-08-12 ENCOUNTER — APPOINTMENT (OUTPATIENT)
Dept: CARDIOLOGY | Facility: CLINIC | Age: 77
End: 2022-08-12

## 2022-08-12 VITALS — HEIGHT: 66 IN | BODY MASS INDEX: 26.52 KG/M2 | TEMPERATURE: 97.3 F | WEIGHT: 165 LBS

## 2022-08-12 VITALS — SYSTOLIC BLOOD PRESSURE: 138 MMHG | DIASTOLIC BLOOD PRESSURE: 60 MMHG | HEART RATE: 51 BPM

## 2022-08-12 PROCEDURE — 93000 ELECTROCARDIOGRAM COMPLETE: CPT

## 2022-08-12 PROCEDURE — 99214 OFFICE O/P EST MOD 30 MIN: CPT | Mod: 25

## 2022-08-12 NOTE — CARDIOLOGY SUMMARY
[___] : [unfilled] [de-identified] : 6- Sinus bradycardia NS T wave change. \par 9-7-2021 Sinus Bradycardia \par 12- NST NS T wave change. \par 08/12/2022 : SB. HR 51 bpm, otheerwise normal  [de-identified] : 2-2022 Lexiscan stress test No sichemia .  [de-identified] : 06/15/2022 EF 62%, mild AR, MR, TR\par 6- EF 59% mild to mod MR trace TR RVSP was 30 mmhg mild dilitation of the Sinus of Valsalva \par

## 2022-08-12 NOTE — HISTORY OF PRESENT ILLNESS
[FreeTextEntry1] : 77 y.o. female with PMH of Afib ( on Xarelto), s/p DCCV x2 in 05/2021 and AF/AFL ablation in 09/2021, hyperlipidemia, MR presents for cardiology F/U visit. Patient follows with EP (), amiodarone and BB were discontinued, ILR placement was discussed for long term monitoring with a goal to D/C anticoagulation. Patient is going for colonoscopy.\par \par NM stress test 02/2022 - no ischemia\par TTE 06/15/2022 - EF 62%\par 07/2022: , LDL 78, Trig 199

## 2022-08-12 NOTE — ASSESSMENT
[FreeTextEntry1] : The patient has had no clinical atrial arrhythmias. She is now off Amiodarone . The patient has had a nuclear stress test which was negative for ischemia. She has no CP or SOB . She does have MR  which is now mild . May have been worse secondary to fluid overload and  AF . Seen by Dr. Posada and is off of beta blockers and Amio. She is going for Colonoscopy . The patint is an intermediate cardiac risk undergoing a minor risk procedure.

## 2022-08-12 NOTE — CARDIOLOGY SUMMARY
[___] : [unfilled] [de-identified] : 6- Sinus bradycardia NS T wave change. \par 9-7-2021 Sinus Bradycardia \par 12- NST NS T wave change. \par 08/12/2022 : SB. HR 51 bpm, otheerwise normal  [de-identified] : 2-2022 Lexiscan stress test No sichemia .  [de-identified] : 06/15/2022 EF 62%, mild AR, MR, TR\par 6- EF 59% mild to mod MR trace TR RVSP was 30 mmhg mild dilitation of the Sinus of Valsalva \par

## 2022-08-14 ENCOUNTER — NON-APPOINTMENT (OUTPATIENT)
Age: 77
End: 2022-08-14

## 2022-09-01 ENCOUNTER — RX RENEWAL (OUTPATIENT)
Age: 77
End: 2022-09-01

## 2022-12-19 ENCOUNTER — NON-APPOINTMENT (OUTPATIENT)
Age: 77
End: 2022-12-19

## 2023-02-01 NOTE — PRE-ANESTHESIA EVALUATION ADULT - NSANTHBMIRD_ENT_A_CORE
Impression: Other vitreous opacities, bilateral: H43.393. Plan: Discussed diagnosis in detail with patient. OPTOS ordered & reviewed with patient. No treatment is required at this time. Will continue to observe condition and or symptoms. Discussed signs and symptoms of retinal detachment. Discussed signs and symptoms of PVD/floaters. Discussed risks of progression. Call if symptoms worsens. No

## 2023-02-15 ENCOUNTER — APPOINTMENT (OUTPATIENT)
Dept: CARDIOLOGY | Facility: CLINIC | Age: 78
End: 2023-02-15
Payer: MEDICARE

## 2023-02-15 VITALS — DIASTOLIC BLOOD PRESSURE: 76 MMHG | SYSTOLIC BLOOD PRESSURE: 128 MMHG | HEART RATE: 67 BPM

## 2023-02-15 VITALS — BODY MASS INDEX: 25.88 KG/M2 | TEMPERATURE: 97.6 F | HEIGHT: 66 IN | WEIGHT: 161 LBS

## 2023-02-15 PROCEDURE — 93000 ELECTROCARDIOGRAM COMPLETE: CPT

## 2023-02-15 PROCEDURE — 99214 OFFICE O/P EST MOD 30 MIN: CPT | Mod: 25

## 2023-02-15 RX ORDER — SODIUM SULFATE, POTASSIUM SULFATE, MAGNESIUM SULFATE 17.5; 3.13; 1.6 G/ML; G/ML; G/ML
17.5-3.13-1.6 SOLUTION, CONCENTRATE ORAL
Qty: 354 | Refills: 0 | Status: DISCONTINUED | COMMUNITY
Start: 2022-07-15 | End: 2023-02-15

## 2023-02-15 NOTE — HISTORY OF PRESENT ILLNESS
[FreeTextEntry1] : The patient has had no chest pain or palpitations . S/P AF/AFL ablation  She has an ILM . . She has MR ,last echo showed mild MR .

## 2023-02-15 NOTE — ASSESSMENT
[FreeTextEntry1] : The patient has had no clinical atrial arrhythmias. She is now off Amiodarone . The patient has had a nuclear stress test which was negative for ischemia. She has no CP or SOB . She does have MR  which is now mild . May have been worse secondary to fluid overload and  AF . Seen by Dr. Posada and is off of beta blockers and Amio. The patient remains in NSR .  TG are high l Will hold of with fish oil in view of AF and xarelto . Will consider fenofibrates if unable to control these by diet

## 2023-02-15 NOTE — CARDIOLOGY SUMMARY
[___] : [unfilled] [de-identified] : 6- Sinus bradycardia NS T wave change. \par 9-7-2021 Sinus Bradycardia \par 12- NST NS T wave change. \par 08/12/2022 : SB. HR 51 bpm, otheerwise normal \par 2- NSR NS T wave chagne.  [de-identified] : 2-2022 Lexiscan stress test No sichemia .  [de-identified] : 06/15/2022 EF 62%, mild AR, MR, TR\par 6- EF 59% mild to mod MR trace TR RVSP was 30 mmhg mild dilitation of the Sinus of Valsalva \par

## 2023-02-15 NOTE — PHYSICAL EXAM
[General Appearance - Well Developed] : well developed [Normal Appearance] : normal appearance [Well Groomed] : well groomed [General Appearance - Well Nourished] : well nourished [No Deformities] : no deformities [General Appearance - In No Acute Distress] : no acute distress [Normal Conjunctiva] : the conjunctiva exhibited no abnormalities [Eyelids - No Xanthelasma] : the eyelids demonstrated no xanthelasmas [Normal Oral Mucosa] : normal oral mucosa [No Oral Pallor] : no oral pallor [No Oral Cyanosis] : no oral cyanosis [Normal Rate] : normal [Rhythm Regular] : regular [No Murmur] : no murmurs heard [2+] : left 2+ [No Pitting Edema] : no pitting edema present [Respiration, Rhythm And Depth] : normal respiratory rhythm and effort [Exaggerated Use Of Accessory Muscles For Inspiration] : no accessory muscle use [Abdomen Soft] : soft [Auscultation Breath Sounds / Voice Sounds] : lungs were clear to auscultation bilaterally [Abdomen Tenderness] : non-tender [Abdomen Mass (___ Cm)] : no abdominal mass palpated [Abnormal Walk] : normal gait [Gait - Sufficient For Exercise Testing] : the gait was sufficient for exercise testing [Skin Color & Pigmentation] : normal skin color and pigmentation [] : no rash [No Venous Stasis] : no venous stasis [Skin Lesions] : no skin lesions [No Skin Ulcers] : no skin ulcer [No Xanthoma] : no  xanthoma was observed [Affect] : the affect was normal [Oriented To Time, Place, And Person] : oriented to person, place, and time [Mood] : the mood was normal [No Anxiety] : not feeling anxious [FreeTextEntry1] : No JVD

## 2023-03-09 ENCOUNTER — APPOINTMENT (OUTPATIENT)
Dept: ELECTROPHYSIOLOGY | Facility: CLINIC | Age: 78
End: 2023-03-09
Payer: MEDICARE

## 2023-03-09 VITALS
BODY MASS INDEX: 25.71 KG/M2 | TEMPERATURE: 97.1 F | RESPIRATION RATE: 16 BRPM | HEIGHT: 66 IN | DIASTOLIC BLOOD PRESSURE: 80 MMHG | HEART RATE: 69 BPM | SYSTOLIC BLOOD PRESSURE: 150 MMHG | WEIGHT: 160 LBS

## 2023-03-09 PROCEDURE — 99214 OFFICE O/P EST MOD 30 MIN: CPT | Mod: 25

## 2023-03-09 PROCEDURE — 93000 ELECTROCARDIOGRAM COMPLETE: CPT

## 2023-03-10 NOTE — ADDENDUM
[FreeTextEntry1] : ILainey assisted in documentation on 03/10/2023 acting as a scribe for Dr. Rock Vines.\par \par

## 2023-03-10 NOTE — ASSESSMENT
[FreeTextEntry1] : ## persistent atrial fibrillation s/p DCCV (x2) s/p ablation\par ## HFrEF- recovered\par ## Moderate to severe MR- better after ablation.\par ## Pre-op Assessment\par \par - Feels better after ablation.\par - On Xarelto. Compliant. No bleeding issues.\par - Continue with anticoagulation. No ILR as per patient wishes. \par - In sinus. Feels excellent. Will DC amiodarone and BB.\par - OK to hold Xarelto for 3 days for colonoscopy and any procedure pertaining to renal cyst\par - Return in 1 year.

## 2023-03-10 NOTE — HISTORY OF PRESENT ILLNESS
[FreeTextEntry1] : I had a pleasure of seeing Ms. VELASQUEZ for follow-up consultation for Atrial Fibrillation. \par \par Ms. VELASQUEZ is a 76 year-year old female with history of persistent atrial fibrillation s/p DCCV (x2 in 05/21), moderate to severe MR during AF, HFrEF is here for f-up.\par Feels fatigue.\par Denies chest pain, shortness of breath, palpitation, dizziness or LOC except noted above.\par \par 6/30: Feels better. Remains in sinus. Echo in sinus shows mild to mod MR.\par 9/9: s/p AF/AFL ablation. Feels much better. Mild fatigue on exertion. No chest pain/SOB at rest.\par 11/18: Feels excellent. Was concerned about puffy legs.\par 2/17/22: Feels excellent. No c/o.\par 08/11/22: Feels fine. Going for colonoscopy. Found to have renal cyst.\par 03/09/2023: Feels excellent. Does not want to go for ILR after her discussion with Dr. Hairston. \par \par Denies chest pain, shortness of breath, palpitation, dizziness or LOC except noted above.\par \par EKG (03/09/2023): SR \par EKG (08/11/22): SR@56\par EKG (2/17/22): SR @ 60\par EKG (11/18): SR @ 59\par EKG (09/09): SR@55, [place] 152ms,  ms\par EKG (06/30): SR@59/min, IL: 172 ms, QRSd 90 ms\par EKG: SR@ 48/min,  ms, QRS 88 ms\par TTE (06/16/21): EF 35-40%, mod MR/LAE\par TTE (06/30/21-post DCCV): EF 59%, mild to mod MR\par Cardio: Dr. Hairston

## 2023-04-16 NOTE — ED ADULT TRIAGE NOTE - PATIENT ON (OXYGEN DELIVERY METHOD)
This writer made a 2nd attempt to reach out to the Pt regarding his appointment that was canceled with TC due to his provider unexpectedly out of the office. This writer offered to discuss options and gave her direct number.     Roxanne Alford,    4/16/23  4:02p   room air placed on nrb/room air

## 2023-06-05 ENCOUNTER — NON-APPOINTMENT (OUTPATIENT)
Age: 78
End: 2023-06-05

## 2023-06-06 ENCOUNTER — EMERGENCY (EMERGENCY)
Facility: HOSPITAL | Age: 78
LOS: 0 days | Discharge: HOME | End: 2023-06-06
Attending: EMERGENCY MEDICINE
Payer: MEDICARE

## 2023-06-06 VITALS
DIASTOLIC BLOOD PRESSURE: 85 MMHG | OXYGEN SATURATION: 95 % | HEIGHT: 66 IN | RESPIRATION RATE: 18 BRPM | WEIGHT: 160.06 LBS | TEMPERATURE: 98 F | HEART RATE: 76 BPM | SYSTOLIC BLOOD PRESSURE: 151 MMHG

## 2023-06-06 DIAGNOSIS — Z79.01 LONG TERM (CURRENT) USE OF ANTICOAGULANTS: ICD-10-CM

## 2023-06-06 DIAGNOSIS — M51.26 OTHER INTERVERTEBRAL DISC DISPLACEMENT, LUMBAR REGION: Chronic | ICD-10-CM
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
## 2023-06-06 DIAGNOSIS — I48.91 UNSPECIFIED ATRIAL FIBRILLATION: ICD-10-CM

## 2023-06-06 DIAGNOSIS — S90.32XA CONTUSION OF LEFT FOOT, INITIAL ENCOUNTER: ICD-10-CM

## 2023-06-06 DIAGNOSIS — Z87.39 PERSONAL HISTORY OF OTHER DISEASES OF THE MUSCULOSKELETAL SYSTEM AND CONNECTIVE TISSUE: ICD-10-CM
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
## 2023-06-06 DIAGNOSIS — Z98.890 OTHER SPECIFIED POSTPROCEDURAL STATES: Chronic | ICD-10-CM

## 2023-06-06 PROCEDURE — 99283 EMERGENCY DEPT VISIT LOW MDM: CPT

## 2023-06-06 PROCEDURE — 73630 X-RAY EXAM OF FOOT: CPT | Mod: LT

## 2023-06-06 PROCEDURE — 99283 EMERGENCY DEPT VISIT LOW MDM: CPT | Mod: 25

## 2023-06-06 PROCEDURE — 73630 X-RAY EXAM OF FOOT: CPT | Mod: 26,LT

## 2023-06-06 NOTE — ED ADULT NURSE NOTE - NSFALLUNIVINTERV_ED_ALL_ED
Bed/Stretcher in lowest position, wheels locked, appropriate side rails in place/Call bell, personal items and telephone in reach/Instruct patient to call for assistance before getting out of bed/chair/stretcher/Non-slip footwear applied when patient is off stretcher/[place] to call system/Physically safe environment - no spills, clutter or unnecessary equipment/Purposeful proactive rounding/Room/bathroom lighting operational, light cord in reach

## 2023-06-06 NOTE — ED PROVIDER NOTE - OBJECTIVE STATEMENT
77 y/o female on xarelto presents to the ED with left foot bruising s/p direct blow 3 days ago. patient denies any tingling distally. no ankle or knee pain. patient with bruising and swelling to foot. no streaking up leg. no laceration.

## 2023-06-06 NOTE — ED PROVIDER NOTE - PATIENT PORTAL LINK FT
You can access the FollowMyHealth Patient Portal offered by [facility] by registering at the following website: [email or web address]. By joining [employer]’s FollowMyHealth portal, you will also be able to view your health information using other applications (apps) compatible with our system.

## 2023-06-06 NOTE — ED PROVIDER NOTE - PHYSICAL EXAMINATION
Vital Signs: I have reviewed the initial vital signs.  Constitutional: well-nourished, no acute distress  Musculoskeletal: good ROM of extremity,  no bony tenderness, no deformity, good peripheral pulses  Integumentary: left foot- dorsal aspect including toes- (+) ecchymosis (+) swelling , good cap refill  Neurologic: awake, alert, extremities’ motor and sensory functions grossly intact, no focal deficits  heme: (-) no adenopathy (-)lymphangitis

## 2023-06-26 ENCOUNTER — APPOINTMENT (OUTPATIENT)
Dept: CARDIOLOGY | Facility: CLINIC | Age: 78
End: 2023-06-26
Payer: MEDICARE

## 2023-06-26 VITALS
HEIGHT: 66 IN | HEART RATE: 57 BPM | WEIGHT: 159 LBS | BODY MASS INDEX: 25.55 KG/M2 | SYSTOLIC BLOOD PRESSURE: 156 MMHG | DIASTOLIC BLOOD PRESSURE: 78 MMHG

## 2023-06-26 PROCEDURE — 93000 ELECTROCARDIOGRAM COMPLETE: CPT

## 2023-06-26 PROCEDURE — 99214 OFFICE O/P EST MOD 30 MIN: CPT | Mod: 25

## 2023-06-26 RX ORDER — AMLODIPINE BESYLATE 2.5 MG/1
2.5 TABLET ORAL
Qty: 90 | Refills: 3 | Status: ACTIVE | COMMUNITY
Start: 2021-07-13 | End: 1900-01-01

## 2023-06-26 NOTE — CARDIOLOGY SUMMARY
[___] : [unfilled] [de-identified] : 6- Sinus bradycardia NS T wave change. \par 9-7-2021 Sinus Bradycardia \par 12- NST NS T wave change. \par 08/12/2022 : SB. HR 51 bpm, otheerwise normal \par 6- SB NS T wave change \par 2- NSR NS T wave chagne.  [de-identified] : 2-2022 Lexiscan stress test No sichemia .  [de-identified] : 06/15/2022 EF 62%, mild AR, MR, TR\par 6- EF 59% mild to mod MR trace TR RVSP was 30 mmhg mild dilitation of the Sinus of Valsalva \par

## 2023-06-26 NOTE — ASSESSMENT
[FreeTextEntry1] : The patient has had no chest pain  No further AF after ablation . She has had a negative ischemia work up . MR was worse when in AF now in NSR it is mild.and she appears euviolemic on exam

## 2023-10-10 ENCOUNTER — APPOINTMENT (OUTPATIENT)
Dept: CARDIOLOGY | Facility: CLINIC | Age: 78
End: 2023-10-10
Payer: MEDICARE

## 2023-10-10 PROCEDURE — 93306 TTE W/DOPPLER COMPLETE: CPT

## 2023-12-17 NOTE — ASSESSMENT
[FreeTextEntry1] : The patient had LEEANN and DCCV . She was noted to have mod to severe MR . The patient went home and went back into AF with RVR and had HF . She was again cardioverted . The patient has had improved symptms since the second DCCV . The patient has had PAF on her MCOT monitor over the past month . . The MR may be the issue with her AF. 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

## 2024-01-04 ENCOUNTER — APPOINTMENT (OUTPATIENT)
Dept: CARDIOLOGY | Facility: CLINIC | Age: 79
End: 2024-01-04
Payer: MEDICARE

## 2024-01-04 VITALS
SYSTOLIC BLOOD PRESSURE: 124 MMHG | DIASTOLIC BLOOD PRESSURE: 60 MMHG | WEIGHT: 160 LBS | HEIGHT: 66 IN | BODY MASS INDEX: 25.71 KG/M2 | HEART RATE: 68 BPM

## 2024-01-04 DIAGNOSIS — I50.9 HEART FAILURE, UNSPECIFIED: ICD-10-CM

## 2024-01-04 DIAGNOSIS — I48.91 UNSPECIFIED ATRIAL FIBRILLATION: ICD-10-CM

## 2024-01-04 DIAGNOSIS — I34.0 NONRHEUMATIC MITRAL (VALVE) INSUFFICIENCY: ICD-10-CM

## 2024-01-04 DIAGNOSIS — E78.5 HYPERLIPIDEMIA, UNSPECIFIED: ICD-10-CM

## 2024-01-04 PROCEDURE — 93000 ELECTROCARDIOGRAM COMPLETE: CPT

## 2024-01-04 PROCEDURE — 99214 OFFICE O/P EST MOD 30 MIN: CPT | Mod: 25

## 2024-01-04 RX ORDER — PRAVASTATIN SODIUM 20 MG/1
20 TABLET ORAL
Qty: 90 | Refills: 3 | Status: ACTIVE | COMMUNITY
Start: 2021-12-30 | End: 1900-01-01

## 2024-01-04 NOTE — CARDIOLOGY SUMMARY
[de-identified] : 6- Sinus bradycardia NS T wave change. \par  9-7-2021 Sinus Bradycardia \par  12- NST NS T wave change. \par  08/12/2022 : SB. HR 51 bpm, otheerwise normal \par  6- SB NS T wave change \par  2- NSR NS T wave chagne.  [de-identified] : 2-2022 Lexiscan stress test No sichemia .  [de-identified] : 06/15/2022 EF 62%, mild AR, MR, TR\par  6- EF 59% mild to mod MR trace TR RVSP was 30 mmhg mild dilitation of the Sinus of Valsalva \par   [___] : [unfilled]

## 2024-01-04 NOTE — PHYSICAL EXAM
[General Appearance - Well Developed] : well developed [Normal Appearance] : normal appearance [Well Groomed] : well groomed [General Appearance - Well Nourished] : well nourished [No Deformities] : no deformities [General Appearance - In No Acute Distress] : no acute distress [Normal Conjunctiva] : the conjunctiva exhibited no abnormalities [Eyelids - No Xanthelasma] : the eyelids demonstrated no xanthelasmas [Normal Oral Mucosa] : normal oral mucosa [No Oral Pallor] : no oral pallor [No Oral Cyanosis] : no oral cyanosis [FreeTextEntry1] : No JVD  [Normal Rate] : normal [Rhythm Regular] : regular [No Murmur] : no murmurs heard [2+] : left 2+ [No Pitting Edema] : no pitting edema present [Respiration, Rhythm And Depth] : normal respiratory rhythm and effort [Exaggerated Use Of Accessory Muscles For Inspiration] : no accessory muscle use [Auscultation Breath Sounds / Voice Sounds] : lungs were clear to auscultation bilaterally [Abdomen Soft] : soft [Abdomen Tenderness] : non-tender [Abdomen Mass (___ Cm)] : no abdominal mass palpated [Abnormal Walk] : normal gait [Gait - Sufficient For Exercise Testing] : the gait was sufficient for exercise testing [Skin Color & Pigmentation] : normal skin color and pigmentation [] : no rash [No Venous Stasis] : no venous stasis [Skin Lesions] : no skin lesions [No Skin Ulcers] : no skin ulcer [No Xanthoma] : no  xanthoma was observed [Oriented To Time, Place, And Person] : oriented to person, place, and time [Affect] : the affect was normal [Mood] : the mood was normal [No Anxiety] : not feeling anxious

## 2024-01-04 NOTE — HISTORY OF PRESENT ILLNESS
[FreeTextEntry1] : The patient has had no chest pain or palpitations . S/P AF/AFL ablation  She has been feeling well since last visit . She has remained in NSR

## 2024-01-04 NOTE — ASSESSMENT
[FreeTextEntry1] : The patient has had no SOB . She has remained in NSR . SHe has had an AF / AFL ablation . LDL is at goal  and BP is normal . ECG is normal . Her MR is moderate on recent echo  no

## 2024-03-07 ENCOUNTER — APPOINTMENT (OUTPATIENT)
Dept: ELECTROPHYSIOLOGY | Facility: CLINIC | Age: 79
End: 2024-03-07
Payer: MEDICARE

## 2024-03-07 VITALS
BODY MASS INDEX: 24.91 KG/M2 | HEART RATE: 67 BPM | HEIGHT: 66 IN | TEMPERATURE: 98 F | SYSTOLIC BLOOD PRESSURE: 130 MMHG | DIASTOLIC BLOOD PRESSURE: 80 MMHG | WEIGHT: 155 LBS

## 2024-03-07 PROCEDURE — 93000 ELECTROCARDIOGRAM COMPLETE: CPT

## 2024-03-07 PROCEDURE — 99214 OFFICE O/P EST MOD 30 MIN: CPT | Mod: 25

## 2024-03-07 NOTE — ASSESSMENT
[FreeTextEntry1] : ## persistent atrial fibrillation s/p DCCV (x2) s/p ablation ## HFrEF- recovered ## Moderate to severe MR- better after ablation.  - Feels better after ablation. - On Xarelto. Compliant. No bleeding issues. - Continue with anticoagulation.  - In sinus. Feels excellent. Off DC amiodarone and BB. - We discussed the options of continuing Xarelto versus ILR monitoring and discontinuation of Xarelto if there is no AF versus her being part of CATALYST-AF trial (DOAC versus Xarelto). We did not discuss the trial in detail. Provided her with an outline that this is a possibility. However, she wants to think about it, especially for loop recorder. She will let me know. She will also discuss with her cardiologist, Dr. Hairston.  - CBC, BMP every 6 months with PCP.  - RTC as needed.

## 2024-03-07 NOTE — ADDENDUM
[FreeTextEntry1] : Lainey BLAIR assisted in documentation on 03/07/2024 acting as a scribe for Dr. Rock Vines.

## 2024-03-07 NOTE — HISTORY OF PRESENT ILLNESS
[FreeTextEntry1] : I had a pleasure of seeing Ms. VELASQUEZ for follow-up consultation for Atrial Fibrillation.   Ms. VELASQUEZ is a 76 year-year old female with history of persistent atrial fibrillation s/p DCCV (x2 in 05/21), moderate to severe MR during AF, HFrEF is here for f-up. Feels fatigue. Denies chest pain, shortness of breath, palpitation, dizziness or LOC except noted above.  6/30: Feels better. Remains in sinus. Echo in sinus shows mild to mod MR. 9/9: s/p AF/AFL ablation. Feels much better. Mild fatigue on exertion. No chest pain/SOB at rest. 11/18: Feels excellent. Was concerned about puffy legs. 2/17/22: Feels excellent. No c/o. 08/11/22: Feels fine. Going for colonoscopy. Found to have renal cyst. 03/09/2023: Feels excellent. Does not want to go for ILR after her discussion with Dr. Hairston.  03/07/2024: Feels fine. She had accident where a jar fell onto her foot and had a leg hematoma. Resolved. Feels better. Denies any palpitations.      Denies chest pain, shortness of breath, palpitation, dizziness or LOC except noted above.  EKG (03/07/2024): SR @ 60, , QRs 92, QTc 395   Echo (10/2023): Normal EF EKG (03/09/2023): SR  EKG (08/11/22): SR@56 EKG (2/17/22): SR @ 60 EKG (11/18): SR @ 59 EKG (09/09): SR@55, [place] 152ms,  ms EKG (06/30): SR@59/min, UT: 172 ms, QRSd 90 ms EKG: SR@ 48/min,  ms, QRS 88 ms TTE (06/16/21): EF 35-40%, mod MR/LAE TTE (06/30/21-post DCCV): EF 59%, mild to mod MR Cardio: Dr. Hairston

## 2024-04-24 NOTE — ED ADULT TRIAGE NOTE - GLASGOW COMA SCALE: BEST MOTOR RESPONSE, MLM
(M6) obeys commands
Continue Regimen: Clobestasol cream bid to affected area up to 2 weeks then qd prn.
Detail Level: Zone
Plan: Advised to reduce exposureto get protective clothing to prevent her skin from being directly exposed to the irritant. Pt verbalized understanding .

## 2024-05-10 NOTE — ED ADULT NURSE NOTE - NSICDXFAMILYHX_GEN_ALL_CORE_FT
Physical Therapy Visit    Visit Type: Daily Treatment Note  Visit: 7  Referring Provider: Jean-Paul Sharp DO  Medical Diagnosis (from order): Z96.652 - Status post total left knee replacement     SUBJECTIVE                                                                                                               Patient stated that her knee is feeling pretty good today and that her blood pressure today has been running a little high today.     Pain / Symptoms  - Pain rating (out of 10): Current: 1       OBJECTIVE                                                                                                                                     Treatment     Therapeutic Exercise  Sit to stands 2 x 10   Ascending/descending 3 steps w/ bilateral upper extremity support x 10 in reciprocal pattern.  Parallel Bar heel raises x 20   Seated left long arc quad 2 x 10   Seated left hamstring stretch 2 x 45 seconds   Passive left knee extension/flexion   Ambulating w/ single point cane 75 feet in step through pattern w/ SBA.     Neuromuscular Re-Education  In parallel bars with supervision assist x 1:   Tandem stance on Airex Foam x 20 sec hold x 3 bilateral   Standing marches without upper extremity support x 20 alternating   Tandem walking 10 feet x 10 reps      Skilled input: verbal instruction/cues and tactile instruction/cues    Writer verbally educated and received verbal consent for hand placement, positioning of patient, and techniques to be performed today from patient for clothing adjustments for techniques, hand placement and palpation for techniques and therapist position for techniques as described above and how they are pertinent to the patient's plan of care.  Home Exercise Program  Access Code: [email or web address]  URL: [email or web address]  Date: 05/10/2024  Prepared by: Matt Clifford    Exercises  - Mini Squat with Counter Support  - 1 x daily - 7 x weekly - 3 sets - 10 reps  - Standing  Knee Flexion with Unilateral Counter Support  - 1 x daily - 7 x weekly - 3 sets - 10 reps  - Heel Toe Raises with Unilateral Counter Support  - 1 x daily - 7 x weekly - 3 sets - 10 reps  - Seated Long Arc Quad  - 1 x daily - 7 x weekly - 3 sets - 10 reps  - Standing March with Counter Support  - 1 x daily - 7 x weekly - 3 sets - 10 reps  - Step Up  - 1 x daily - 7 x weekly - 3 sets - 10 reps  - Seated Hamstring Stretch  - 1 x daily - 7 x weekly - 3 sets - 10 reps  - Standing Knee Flexion Stretch on Step  - 1 x daily - 7 x weekly - 3 sets - 10 reps    Patient Education  - Scar Massage      ASSESSMENT                                                                                                            Patient was able to ascend and descend 3 steps today in reciprocal pattern for first time sense knee surgery. She did require demonstration and verbal cueing for correct technique. Her balance is improving and her gait mechanics with single point cane got better the more she ambulated.   Pain/symptoms after session (out of 10): 1  Education:   - Results of above outlined education: Verbalizes understanding and Demonstrates understanding    PLAN                                                                                                                           Suggestions for next session as indicated: Progress per plan of care, keep progressing steps exercises and strengthening.        Therapy procedure time and total treatment time can be found documented on the Time Entry flowsheet     FAMILY HISTORY:  Father  Still living? Unknown  FH: prostate cancer, Age at diagnosis: Age Unknown    Sibling  Still living? Unknown  FH: heart disease, Age at diagnosis: Age Unknown
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
## 2024-06-24 ENCOUNTER — RX RENEWAL (OUTPATIENT)
Age: 79
End: 2024-06-24

## 2024-06-24 RX ORDER — RIVAROXABAN 20 MG/1
20 TABLET, FILM COATED ORAL
Qty: 90 | Refills: 3 | Status: ACTIVE | COMMUNITY
Start: 1900-01-01 | End: 1900-01-01

## 2024-07-15 ENCOUNTER — APPOINTMENT (OUTPATIENT)
Dept: CARDIOLOGY | Facility: CLINIC | Age: 79
End: 2024-07-15
Payer: MEDICARE

## 2024-07-15 VITALS — DIASTOLIC BLOOD PRESSURE: 80 MMHG | HEART RATE: 55 BPM | SYSTOLIC BLOOD PRESSURE: 146 MMHG

## 2024-07-15 VITALS — WEIGHT: 158 LBS | HEIGHT: 66 IN | BODY MASS INDEX: 25.39 KG/M2

## 2024-07-15 DIAGNOSIS — I48.91 UNSPECIFIED ATRIAL FIBRILLATION: ICD-10-CM

## 2024-07-15 DIAGNOSIS — I50.9 HEART FAILURE, UNSPECIFIED: ICD-10-CM

## 2024-07-15 DIAGNOSIS — E78.5 HYPERLIPIDEMIA, UNSPECIFIED: ICD-10-CM

## 2024-07-15 DIAGNOSIS — I34.0 NONRHEUMATIC MITRAL (VALVE) INSUFFICIENCY: ICD-10-CM

## 2024-07-15 PROCEDURE — 93000 ELECTROCARDIOGRAM COMPLETE: CPT

## 2024-07-15 PROCEDURE — 99214 OFFICE O/P EST MOD 30 MIN: CPT | Mod: 25

## 2024-10-23 ENCOUNTER — EMERGENCY (EMERGENCY)
Facility: HOSPITAL | Age: 79
LOS: 0 days | Discharge: HOME | End: 2024-10-23
Attending: STUDENT IN AN ORGANIZED HEALTH CARE EDUCATION/TRAINING PROGRAM
Payer: MEDICARE

## 2024-10-23 VITALS
DIASTOLIC BLOOD PRESSURE: 81 MMHG | HEART RATE: 93 BPM | SYSTOLIC BLOOD PRESSURE: 119 MMHG | RESPIRATION RATE: 16 BRPM | TEMPERATURE: 98 F | OXYGEN SATURATION: 98 % | WEIGHT: 139.99 LBS

## 2024-10-23 DIAGNOSIS — I10 ESSENTIAL (PRIMARY) HYPERTENSION: ICD-10-CM

## 2024-10-23 DIAGNOSIS — R22.41 LOCALIZED SWELLING, MASS AND LUMP, RIGHT LOWER LIMB: ICD-10-CM

## 2024-10-23 DIAGNOSIS — Z79.01 LONG TERM (CURRENT) USE OF ANTICOAGULANTS: ICD-10-CM

## 2024-10-23 DIAGNOSIS — M25.561 PAIN IN RIGHT KNEE: ICD-10-CM

## 2024-10-23 DIAGNOSIS — M51.26 OTHER INTERVERTEBRAL DISC DISPLACEMENT, LUMBAR REGION: Chronic | ICD-10-CM
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
## 2024-10-23 DIAGNOSIS — Z98.890 OTHER SPECIFIED POSTPROCEDURAL STATES: Chronic | ICD-10-CM

## 2024-10-23 DIAGNOSIS — M25.461 EFFUSION, RIGHT KNEE: ICD-10-CM

## 2024-10-23 DIAGNOSIS — S80.01XA CONTUSION OF RIGHT KNEE, INITIAL ENCOUNTER: ICD-10-CM

## 2024-10-23 DIAGNOSIS — I48.91 UNSPECIFIED ATRIAL FIBRILLATION: ICD-10-CM

## 2024-10-23 PROCEDURE — 99283 EMERGENCY DEPT VISIT LOW MDM: CPT | Mod: 25

## 2024-10-23 PROCEDURE — 73564 X-RAY EXAM KNEE 4 OR MORE: CPT | Mod: 26,RT

## 2024-10-23 PROCEDURE — 99283 EMERGENCY DEPT VISIT LOW MDM: CPT

## 2024-10-23 PROCEDURE — 73564 X-RAY EXAM KNEE 4 OR MORE: CPT | Mod: RT

## 2024-10-23 NOTE — ED PROVIDER NOTE - PATIENT PORTAL LINK FT
You can access the FollowMyHealth Patient Portal offered by [facility] by registering at the following website: [email or web address]. By joining [employer]’s FollowMyHealth portal, you will also be able to view your health information using other applications (apps) compatible with our system.

## 2024-10-23 NOTE — ED PROVIDER NOTE - PHYSICAL EXAMINATION
Physical Exam    Vital Signs: I have reviewed the initial vital signs.  Constitutional: appears stated age, no acute distress  Eyes: Conjunctiva pink, Sclera clear,  Cardiovascular: S1 and S2, regular rate, regular rhythm, well-perfused extremities, radial pulses equal and 2+, pedal pulses 2+ and equal  Respiratory: unlabored respiratory effort, clear to auscultation bilaterally no wheezing, rales and rhonchi  Gastrointestinal: soft, non-tender abdomen, no pulsatile mass, normal bowl sounds  Musculoskeletal: right knee mild ttp with diffuse swelling and ecchymosis  Integumentary: warm, dry, no rash  Neurologic: awake, alert,  nvi

## 2024-10-23 NOTE — ED PROVIDER NOTE - ATTENDING APP SHARED VISIT CONTRIBUTION OF CARE
78 yo F with hx of afib on xarelto, CHF, HTN who presents with R knee pain and swelling x1 day. Pt says she tripped on uneven ground yesterday, falling on her knees. No head trauma or LOC. No preceding cp, sob, dizziness prior to fall. Did not have much pain and was able to walk. Today woke up with swelling to R knee so came to ED. Able to bear weight. No weakness, numbness.    PMD Dr. Seaman    CONSTITUTIONAL: well developed, nontoxic appearing, in no acute distress, speaking in full sentences  SKIN: warm, dry  HEENT: normocephalic, no conjunctival erythema, moist mucous membranes, patent airway  NECK: supple  CV:  regular rate  RESP: normal work of breathing  ABD: nondistended  MSK: R knee swelling with ecchymosis, mild R knee tenderness, normal extension/flexion of hip, normal extension/flexion of knees, normal dorsiflexion/plantarflexion of ankle, 2+ DP pulses, sensation intact to touch, cap refill <2 seconds, normal gait, abrasions to L knee, moves all extremities, no cyanosis, negative anterior/posterior drawer test  NEURO: alert, oriented, grossly unremarkable  PSYCH: cooperative, appropriate    A&P:  Pt here with R knee swelling and pain after mechanical fall yesterday in setting of being on xarelto, concern for effusion. Vitals wnl in ED. Neurovascularly intact. Full ROM. No joint laxity on exam. Plan for xray r/o fx, dislocation, effusion. Discussed with pt cannot r/o ligamentous or meniscal injury and that pt may need outpatient ortho f/u +/- MRI which is currently not feasible in ED.

## 2024-10-23 NOTE — ED ADULT TRIAGE NOTE - CHIEF COMPLAINT QUOTE
Patient states she tripped and fell, patient is on blood thinners. Denies head trauma, complaining of right knee pain and swelling.

## 2024-10-23 NOTE — ED PROVIDER NOTE - NSFOLLOWUPINSTRUCTIONS_ED_ALL_ED_FT
Our Emergency Department Referral Coordinators will be reaching out to you in the next 24-48 hours from 9:00am to 5:00pm to schedule a follow up appointment for orthopedics. Please expect a phone call from the hospital in that time frame. If you do not receive a call or if you have any questions or concerns, you can reach them at [phone].  ----  R.I.C.E. Treatment    R.I.C.E. treatment is a 4-step process used to decrease swelling and pain caused by an injury. R.I.C.E. stands for rest, ice, compression, and elevation. R.I.C.E. should be done within 24 to 48 hours after an injury.     Seek care immediately if:   - Your pain is severe.  - You have severe swelling or deformity.  - You have numbness in the injured area.    Contact your healthcare provider if:   - Your pain and swelling does not go away after a few days.  - You have questions or concerns about your condition or care.    How to use R.I.C.E. treatment:     Rest your injured area as directed. You may need to stop using, or keep weight off, the injury for 48 hours or longer. Your healthcare provider may recommend crutches or another device. Return to your usual activities as directed.     Apply ice on your injured area for 15 to 20 minutes every 4 hours or as directed. Use an ice pack, or put crushed ice in a plastic bag. Cover it with a towel. Ice helps prevent tissue damage and decreases swelling and pain.    Compress, or keep pressure on, the injured area. Compression will help decrease swelling and support the injured area. Use an elastic bandage, air stirrup, splint, or sling as directed. If you use an elastic bandage to wrap your injured area, make sure the bandage is not too tight.     Elevate the injured area above the level of your heart as often as you can. This will help decrease swelling and pain. Prop the injured area on pillows or blankets to keep it elevated comfortably.     Follow up with your healthcare provider as directed: Write down your questions so you remember to ask them during your visits.

## 2024-10-23 NOTE — ED PROVIDER NOTE - DIFFERENTIAL DIAGNOSIS
Differential Diagnosis differential dx includes but is not limited to:  fx, dislocation, effusion, ligamentous or meniscal injury

## 2024-10-23 NOTE — ED ADULT NURSE NOTE - NS_NURSE_DISC_TEACHING_YN_ED_ALL_ED
Sure - can take over the counter vitamin D and calcium - recommend 2000 units calcium D daily   Calcium is low due to vitamin D being low.   Please advise per below.   What did he want to do for cholesterol?          CALCIUM    Recommendations:  Teenagers and premenopausal women: 1200 mg/day  Pregnant and Lactating women: 1500 mg/day  Men and Postmenopausal women on estrogen: 1200mg/day  Postmenopausal women not on estrogen: 1500 mg/day    If you are not eating dairy products you also need 400 IU of vitamin D per day which can be obtained in either a multivitamin or in some of the Calcium tablets.    Dietary sources: These also contain vitamin D  Milk                            8 oz            300 mg  Yogurt                          1 cup           400 mg  Hard cheese                     1.5 oz          300 mg  Cottage cheese                  2 cup           300 mg  Orange juice with Calcium       8 oz            300 mg  Low fat dairy sources are recommended    Supplements:  Tums EX                         300 mg  Tums Ultra                      400 mg  Caltrate 600                    600 mg  Oscal                           500 mg  Oscal/D                         500 mg plus vitamin D  Women's Formula Multivitamin    450 mg            Yes

## 2024-10-23 NOTE — ED PROVIDER NOTE - OBJECTIVE STATEMENT
78 yo female, pmh of afib on xarelto, htn, p/w right knee pain s/p fall yesterday, no other injury, mild, aching, no radiation a/w bruising and swelling, denies chi. Denies fever, chills, cp, sob, neck pain, visual changes, nvd, dizziness, numbness, tingling.

## 2024-10-23 NOTE — ED PROVIDER NOTE - CLINICAL SUMMARY MEDICAL DECISION MAKING FREE TEXT BOX
Pt here with R knee swelling and pain after mechanical fall yesterday in setting of being on xarelto, concern for effusion. Vitals wnl in ED. Neurovascularly intact. Full ROM. No joint laxity on exam. Plan for xray r/o fx, dislocation, effusion. Discussed with pt cannot r/o ligamentous or meniscal injury and that pt may need outpatient ortho f/u +/- MRI which is currently not feasible in ED. Xray here negative for acute fx or dislocation, shows soft tissue swelling and knee joint effusion. Placed in ACE wrap and given ortho f/u. Strict ED return precautions given. Pt verbalized understanding and was agreeable with plan.

## 2024-10-25 ENCOUNTER — APPOINTMENT (OUTPATIENT)
Dept: ORTHOPEDIC SURGERY | Facility: CLINIC | Age: 79
End: 2024-10-25
Payer: MEDICARE

## 2024-10-25 DIAGNOSIS — S80.01XA CONTUSION OF RIGHT KNEE, INITIAL ENCOUNTER: ICD-10-CM

## 2024-10-25 DIAGNOSIS — S80.11XA CONTUSION OF RIGHT KNEE, INITIAL ENCOUNTER: ICD-10-CM

## 2024-10-25 PROBLEM — I10 ESSENTIAL (PRIMARY) HYPERTENSION: Chronic | Status: ACTIVE | Noted: 2024-10-23

## 2024-10-25 PROBLEM — I50.9 HEART FAILURE, UNSPECIFIED: Chronic | Status: ACTIVE | Noted: 2024-10-23

## 2024-10-25 PROCEDURE — 99203 OFFICE O/P NEW LOW 30 MIN: CPT

## 2024-11-06 ENCOUNTER — APPOINTMENT (OUTPATIENT)
Dept: CARDIOLOGY | Facility: CLINIC | Age: 79
End: 2024-11-06
Payer: MEDICARE

## 2024-11-06 DIAGNOSIS — I48.91 UNSPECIFIED ATRIAL FIBRILLATION: ICD-10-CM

## 2024-11-06 DIAGNOSIS — I34.0 NONRHEUMATIC MITRAL (VALVE) INSUFFICIENCY: ICD-10-CM

## 2024-11-06 DIAGNOSIS — I50.9 HEART FAILURE, UNSPECIFIED: ICD-10-CM

## 2024-11-06 PROCEDURE — 93306 TTE W/DOPPLER COMPLETE: CPT

## 2024-11-25 ENCOUNTER — NON-APPOINTMENT (OUTPATIENT)
Age: 79
End: 2024-11-25

## 2024-12-12 ENCOUNTER — APPOINTMENT (OUTPATIENT)
Dept: ORTHOPEDIC SURGERY | Facility: CLINIC | Age: 79
End: 2024-12-12
Payer: MEDICARE

## 2024-12-12 DIAGNOSIS — S80.01XA CONTUSION OF RIGHT KNEE, INITIAL ENCOUNTER: ICD-10-CM

## 2024-12-12 DIAGNOSIS — S80.11XA CONTUSION OF RIGHT KNEE, INITIAL ENCOUNTER: ICD-10-CM

## 2024-12-12 PROCEDURE — 99213 OFFICE O/P EST LOW 20 MIN: CPT

## 2025-01-13 ENCOUNTER — APPOINTMENT (OUTPATIENT)
Dept: CARDIOLOGY | Facility: CLINIC | Age: 80
End: 2025-01-13
Payer: MEDICARE

## 2025-01-13 VITALS
BODY MASS INDEX: 25.55 KG/M2 | HEIGHT: 66 IN | SYSTOLIC BLOOD PRESSURE: 138 MMHG | DIASTOLIC BLOOD PRESSURE: 70 MMHG | HEART RATE: 67 BPM | WEIGHT: 159 LBS

## 2025-01-13 DIAGNOSIS — I48.91 UNSPECIFIED ATRIAL FIBRILLATION: ICD-10-CM

## 2025-01-13 DIAGNOSIS — I50.9 HEART FAILURE, UNSPECIFIED: ICD-10-CM

## 2025-01-13 DIAGNOSIS — E78.5 HYPERLIPIDEMIA, UNSPECIFIED: ICD-10-CM

## 2025-01-13 PROCEDURE — 99214 OFFICE O/P EST MOD 30 MIN: CPT | Mod: 25

## 2025-01-13 PROCEDURE — 93000 ELECTROCARDIOGRAM COMPLETE: CPT

## 2025-04-07 NOTE — PATIENT PROFILE ADULT - PRO INTERPRETER NEED 2
"Peyton Benitez"Uyen was seen and treated in our emergency department on 4/7/2025.  She may return to work on 04/09/2025.  Patient cleared to work on 04/09/2025 which means she will be cleared to work her scheduled shift on 04/10/2025. Call if you have any further questions regarding this.     If you have any questions or concerns, please don't hesitate to call.      Gregory VELIZ    " English

## 2025-04-30 ENCOUNTER — NON-APPOINTMENT (OUTPATIENT)
Age: 80
End: 2025-04-30

## 2025-04-30 ENCOUNTER — APPOINTMENT (OUTPATIENT)
Facility: CLINIC | Age: 80
End: 2025-04-30
Payer: MEDICARE

## 2025-04-30 PROCEDURE — 92202 OPSCPY EXTND ON/MAC DRAW: CPT

## 2025-04-30 PROCEDURE — 99204 OFFICE O/P NEW MOD 45 MIN: CPT

## 2025-04-30 PROCEDURE — 76512 OPH US DX B-SCAN: CPT | Mod: RT

## 2025-04-30 PROCEDURE — 92134 CPTRZ OPH DX IMG PST SGM RTA: CPT

## 2025-05-09 ENCOUNTER — NON-APPOINTMENT (OUTPATIENT)
Age: 80
End: 2025-05-09

## 2025-05-09 ENCOUNTER — APPOINTMENT (OUTPATIENT)
Facility: CLINIC | Age: 80
End: 2025-05-09

## 2025-05-09 PROCEDURE — 67028 INJECTION EYE DRUG: CPT | Mod: RT

## 2025-05-09 PROCEDURE — 99214 OFFICE O/P EST MOD 30 MIN: CPT | Mod: 25

## 2025-05-09 PROCEDURE — 92134 CPTRZ OPH DX IMG PST SGM RTA: CPT

## 2025-05-09 PROCEDURE — 76512 OPH US DX B-SCAN: CPT | Mod: LT

## 2025-05-13 ENCOUNTER — APPOINTMENT (OUTPATIENT)
Facility: CLINIC | Age: 80
End: 2025-05-13

## 2025-05-14 ENCOUNTER — NON-APPOINTMENT (OUTPATIENT)
Age: 80
End: 2025-05-14

## 2025-05-14 ENCOUNTER — APPOINTMENT (OUTPATIENT)
Dept: CARDIOLOGY | Facility: CLINIC | Age: 80
End: 2025-05-14
Payer: MEDICARE

## 2025-05-14 VITALS — DIASTOLIC BLOOD PRESSURE: 80 MMHG | SYSTOLIC BLOOD PRESSURE: 130 MMHG

## 2025-05-14 VITALS
HEIGHT: 66 IN | WEIGHT: 163 LBS | OXYGEN SATURATION: 96 % | SYSTOLIC BLOOD PRESSURE: 164 MMHG | DIASTOLIC BLOOD PRESSURE: 60 MMHG | HEART RATE: 61 BPM | BODY MASS INDEX: 26.2 KG/M2

## 2025-05-14 DIAGNOSIS — I50.9 HEART FAILURE, UNSPECIFIED: ICD-10-CM

## 2025-05-14 DIAGNOSIS — E78.5 HYPERLIPIDEMIA, UNSPECIFIED: ICD-10-CM

## 2025-05-14 DIAGNOSIS — I48.91 UNSPECIFIED ATRIAL FIBRILLATION: ICD-10-CM

## 2025-05-14 DIAGNOSIS — I34.0 NONRHEUMATIC MITRAL (VALVE) INSUFFICIENCY: ICD-10-CM

## 2025-05-14 PROCEDURE — 93000 ELECTROCARDIOGRAM COMPLETE: CPT

## 2025-05-14 PROCEDURE — 99214 OFFICE O/P EST MOD 30 MIN: CPT | Mod: 25

## 2025-05-16 ENCOUNTER — APPOINTMENT (OUTPATIENT)
Facility: CLINIC | Age: 80
End: 2025-05-16
Payer: MEDICARE

## 2025-05-16 ENCOUNTER — NON-APPOINTMENT (OUTPATIENT)
Age: 80
End: 2025-05-16

## 2025-05-16 PROCEDURE — 92134 CPTRZ OPH DX IMG PST SGM RTA: CPT

## 2025-05-16 PROCEDURE — 99214 OFFICE O/P EST MOD 30 MIN: CPT

## 2025-05-16 PROCEDURE — 92202 OPSCPY EXTND ON/MAC DRAW: CPT

## 2025-05-16 PROCEDURE — 76512 OPH US DX B-SCAN: CPT | Mod: LT

## 2025-05-19 NOTE — ED ADULT NURSE NOTE - NSIMPLEMENTINTERV_GEN_ALL_ED
Implemented All Universal Safety Interventions:  [place] to call system. Call bell, personal items and telephone within reach. Instruct patient to call for assistance. Room bathroom lighting operational. Non-slip footwear when patient is off stretcher. Physically safe environment: no spills, clutter or unnecessary equipment. Stretcher in lowest position, wheels locked, appropriate side rails in place.
27-Feb-2025

## 2025-05-28 ENCOUNTER — APPOINTMENT (OUTPATIENT)
Facility: CLINIC | Age: 80
End: 2025-05-28
Payer: MEDICARE

## 2025-05-28 ENCOUNTER — NON-APPOINTMENT (OUTPATIENT)
Age: 80
End: 2025-05-28

## 2025-05-28 PROCEDURE — 99214 OFFICE O/P EST MOD 30 MIN: CPT

## 2025-05-28 PROCEDURE — 76512 OPH US DX B-SCAN: CPT | Mod: LT

## 2025-05-28 PROCEDURE — 92134 CPTRZ OPH DX IMG PST SGM RTA: CPT

## 2025-05-28 PROCEDURE — 92202 OPSCPY EXTND ON/MAC DRAW: CPT

## 2025-06-02 ENCOUNTER — OUTPATIENT (OUTPATIENT)
Dept: OUTPATIENT SERVICES | Facility: HOSPITAL | Age: 80
LOS: 1 days | Discharge: HOME | End: 2025-06-02
Payer: MEDICARE

## 2025-06-02 ENCOUNTER — APPOINTMENT (OUTPATIENT)
Dept: OPHTHALMOLOGY | Facility: HOSPITAL | Age: 80
End: 2025-06-02

## 2025-06-02 VITALS
DIASTOLIC BLOOD PRESSURE: 75 MMHG | TEMPERATURE: 98 F | WEIGHT: 154.98 LBS | SYSTOLIC BLOOD PRESSURE: 190 MMHG | RESPIRATION RATE: 17 BRPM | HEIGHT: 66 IN | OXYGEN SATURATION: 95 % | HEART RATE: 58 BPM

## 2025-06-02 VITALS — RESPIRATION RATE: 17 BRPM | SYSTOLIC BLOOD PRESSURE: 168 MMHG | DIASTOLIC BLOOD PRESSURE: 79 MMHG | HEART RATE: 66 BPM

## 2025-06-02 DIAGNOSIS — Z98.890 OTHER SPECIFIED POSTPROCEDURAL STATES: Chronic | ICD-10-CM

## 2025-06-02 DIAGNOSIS — M51.26 OTHER INTERVERTEBRAL DISC DISPLACEMENT, LUMBAR REGION: Chronic | ICD-10-CM

## 2025-06-02 DIAGNOSIS — H35.61 RETINAL HEMORRHAGE, RIGHT EYE: ICD-10-CM

## 2025-06-02 PROCEDURE — 67036 REMOVAL OF INNER EYE FLUID: CPT | Mod: RT

## 2025-06-02 NOTE — ASU PATIENT PROFILE, ADULT - FALL HARM RISK - HARM RISK INTERVENTIONS

## 2025-06-02 NOTE — ASU PATIENT PROFILE, ADULT - NSICDXPASTMEDICALHX_GEN_ALL_CORE_FT
PAST MEDICAL HISTORY:  Afib     CHF (congestive heart failure)     HTN (hypertension)      PAST MEDICAL HISTORY:  Afib     Cataract     CHF (congestive heart failure)     HTN (hypertension)

## 2025-06-02 NOTE — ASU PATIENT PROFILE, ADULT - NSICDXPASTSURGICALHX_GEN_ALL_CORE_FT
PAST SURGICAL HISTORY:  H/O varicose vein stripping     Lumbar herniated disc      PAST SURGICAL HISTORY:  H/O cardiac radiofrequency ablation     H/O varicose vein stripping     Lumbar herniated disc

## 2025-06-03 ENCOUNTER — APPOINTMENT (OUTPATIENT)
Facility: CLINIC | Age: 80
End: 2025-06-03
Payer: MEDICARE

## 2025-06-03 ENCOUNTER — NON-APPOINTMENT (OUTPATIENT)
Age: 80
End: 2025-06-03

## 2025-06-03 PROBLEM — H26.9 UNSPECIFIED CATARACT: Chronic | Status: ACTIVE | Noted: 2025-06-02

## 2025-06-03 PROCEDURE — 99024 POSTOP FOLLOW-UP VISIT: CPT

## 2025-06-06 DIAGNOSIS — H43.11 VITREOUS HEMORRHAGE, RIGHT EYE: ICD-10-CM

## 2025-06-06 DIAGNOSIS — I48.91 UNSPECIFIED ATRIAL FIBRILLATION: ICD-10-CM

## 2025-06-06 DIAGNOSIS — Z79.01 LONG TERM (CURRENT) USE OF ANTICOAGULANTS: ICD-10-CM

## 2025-06-06 DIAGNOSIS — I12.9 HYPERTENSIVE CHRONIC KIDNEY DISEASE WITH STAGE 1 THROUGH STAGE 4 CHRONIC KIDNEY DISEASE, OR UNSPECIFIED CHRONIC KIDNEY DISEASE: ICD-10-CM

## 2025-07-02 ENCOUNTER — NON-APPOINTMENT (OUTPATIENT)
Age: 80
End: 2025-07-02

## 2025-07-02 ENCOUNTER — APPOINTMENT (OUTPATIENT)
Facility: CLINIC | Age: 80
End: 2025-07-02

## 2025-07-02 PROCEDURE — 76512 OPH US DX B-SCAN: CPT | Mod: RT

## 2025-07-02 PROCEDURE — 67028 INJECTION EYE DRUG: CPT | Mod: 79,RT

## 2025-07-02 PROCEDURE — 99024 POSTOP FOLLOW-UP VISIT: CPT

## 2025-07-02 PROCEDURE — 92134 CPTRZ OPH DX IMG PST SGM RTA: CPT

## 2025-07-14 ENCOUNTER — APPOINTMENT (OUTPATIENT)
Dept: CARDIOLOGY | Facility: CLINIC | Age: 80
End: 2025-07-14
Payer: MEDICARE

## 2025-07-14 ENCOUNTER — NON-APPOINTMENT (OUTPATIENT)
Age: 80
End: 2025-07-14

## 2025-07-14 VITALS — HEART RATE: 56 BPM | DIASTOLIC BLOOD PRESSURE: 80 MMHG | SYSTOLIC BLOOD PRESSURE: 136 MMHG

## 2025-07-14 VITALS — DIASTOLIC BLOOD PRESSURE: 70 MMHG | SYSTOLIC BLOOD PRESSURE: 124 MMHG

## 2025-07-14 VITALS — HEIGHT: 66 IN | BODY MASS INDEX: 26.03 KG/M2 | WEIGHT: 162 LBS

## 2025-07-14 PROCEDURE — 93000 ELECTROCARDIOGRAM COMPLETE: CPT

## 2025-07-14 PROCEDURE — 99214 OFFICE O/P EST MOD 30 MIN: CPT | Mod: 25

## 2025-08-13 ENCOUNTER — APPOINTMENT (OUTPATIENT)
Facility: CLINIC | Age: 80
End: 2025-08-13

## 2025-08-13 ENCOUNTER — NON-APPOINTMENT (OUTPATIENT)
Age: 80
End: 2025-08-13

## 2025-08-13 PROCEDURE — 92134 CPTRZ OPH DX IMG PST SGM RTA: CPT

## 2025-08-13 PROCEDURE — 99024 POSTOP FOLLOW-UP VISIT: CPT

## 2025-08-13 PROCEDURE — 67028 INJECTION EYE DRUG: CPT | Mod: 79,RT
